# Patient Record
Sex: MALE | Race: WHITE | NOT HISPANIC OR LATINO | Employment: OTHER | URBAN - METROPOLITAN AREA
[De-identification: names, ages, dates, MRNs, and addresses within clinical notes are randomized per-mention and may not be internally consistent; named-entity substitution may affect disease eponyms.]

---

## 2017-04-07 ENCOUNTER — TELEPHONE (OUTPATIENT)
Dept: CARDIOLOGY | Facility: CLINIC | Age: 72
End: 2017-04-07

## 2017-04-07 NOTE — TELEPHONE ENCOUNTER
I will not be able to take care of this patient at [facility].  It has been multiple years since I have seen him, and I do not go to [facility].  I have not seen him since I have been at [facility].  There are cardiologists that evidently go to [facility] to see patients.  I would advise that she contact one of these cardiologist or have someone consult one of these cardiologist for further care.  Thanks.    SG

## 2017-04-07 NOTE — TELEPHONE ENCOUNTER
04/07/17  12:20 PM  Juan Ann  1945     Yue [place] - [phone]    Yue with [facility] calls about this patient who Dr. Moses saw when he was with [facility]. This patient has a medtronic pacemaker; they think it was placed on 12/23/12.     Mr. Ann is a patient at [facility]. He has been in a vegetative state for 3 years.     Yue states that she is unsure of when Mr. Ann's pacemaker was last checked. She is also not sure if it is still working.     She faxed me the temporary [employer] card. She states that she spoke with [employer] and they advised she contact us.     To whom should I refer her?    Mayra CURRIE RN

## 2018-06-29 ENCOUNTER — INPATIENT HOSPITAL (OUTPATIENT)
Dept: URBAN - METROPOLITAN AREA HOSPITAL 84 | Facility: HOSPITAL | Age: 73
End: 2018-06-29

## 2018-06-29 DIAGNOSIS — K44.9 DIAPHRAGMATIC HERNIA WITHOUT OBSTRUCTION OR GANGRENE: ICD-10-CM

## 2018-06-29 DIAGNOSIS — Z43.1 ENCOUNTER FOR ATTENTION TO GASTROSTOMY: ICD-10-CM

## 2018-06-29 PROCEDURE — 43246 EGD PLACE GASTROSTOMY TUBE: CPT | Performed by: INTERNAL MEDICINE

## 2020-08-18 ENCOUNTER — APPOINTMENT (OUTPATIENT)
Dept: GENERAL RADIOLOGY | Facility: HOSPITAL | Age: 75
End: 2020-08-18

## 2020-08-18 ENCOUNTER — HOSPITAL ENCOUNTER (OUTPATIENT)
Facility: HOSPITAL | Age: 75
Setting detail: OBSERVATION
Discharge: HOME | End: 2020-08-21
Attending: EMERGENCY MEDICINE | Admitting: HOSPITALIST

## 2020-08-18 DIAGNOSIS — Z95.828 STATUS POST PICC CENTRAL LINE PLACEMENT: Primary | ICD-10-CM

## 2020-08-18 DIAGNOSIS — Z43.1 ATTENTION TO G-TUBE (HCC): ICD-10-CM

## 2020-08-18 PROBLEM — E11.65 UNCONTROLLED TYPE 2 DIABETES MELLITUS WITH HYPERGLYCEMIA (HCC): Status: ACTIVE | Noted: 2020-05-19

## 2020-08-18 PROBLEM — G40.909 SEIZURE DISORDER (HCC): Status: ACTIVE | Noted: 2020-08-18

## 2020-08-18 PROBLEM — M24.562 CONTRACTURES INVOLVING BOTH KNEES: Status: ACTIVE | Noted: 2020-08-18

## 2020-08-18 PROBLEM — Z95.1 HISTORY OF CORONARY ARTERY BYPASS GRAFT: Status: ACTIVE | Noted: 2020-08-18

## 2020-08-18 PROBLEM — I10 HTN (HYPERTENSION): Status: ACTIVE | Noted: 2020-08-18

## 2020-08-18 PROBLEM — I63.9 CEREBRAL INFARCT (HCC): Status: ACTIVE | Noted: 2020-08-18

## 2020-08-18 PROBLEM — I61.9 INTRACEREBRAL HEMORRHAGE (HCC): Status: ACTIVE | Noted: 2020-08-18

## 2020-08-18 PROBLEM — Z93.0 TRACHEOSTOMY PRESENT (HCC): Status: ACTIVE | Noted: 2020-08-18

## 2020-08-18 PROBLEM — E11.9 DIABETES MELLITUS WITHOUT COMPLICATION (HCC): Status: ACTIVE | Noted: 2020-08-18

## 2020-08-18 PROBLEM — N31.9 NEUROMUSCULAR DYSFUNCTION OF BLADDER: Status: ACTIVE | Noted: 2020-08-18

## 2020-08-18 PROBLEM — K21.9 GERD (GASTROESOPHAGEAL REFLUX DISEASE): Status: ACTIVE | Noted: 2020-08-18

## 2020-08-18 PROBLEM — M62.50 MUSCULAR ATROPHY: Status: ACTIVE | Noted: 2020-08-18

## 2020-08-18 PROBLEM — M24.561 CONTRACTURES INVOLVING BOTH KNEES: Status: ACTIVE | Noted: 2020-08-18

## 2020-08-18 PROBLEM — G81.90 HEMIPLEGIA (HCC): Status: ACTIVE | Noted: 2020-08-18

## 2020-08-18 PROBLEM — Z93.0 TRACHEOSTOMY STATUS (HCC): Status: ACTIVE | Noted: 2020-08-18

## 2020-08-18 PROBLEM — R13.10 DYSPHAGIA: Status: ACTIVE | Noted: 2020-08-18

## 2020-08-18 PROBLEM — E46 MALNUTRITION (HCC): Status: ACTIVE | Noted: 2020-08-18

## 2020-08-18 PROBLEM — I25.10 CAD (CORONARY ARTERY DISEASE): Status: ACTIVE | Noted: 2020-08-18

## 2020-08-18 PROBLEM — I69.959 HEMIPLEGIA OF DOMINANT SIDE AS LATE EFFECT FOLLOWING CEREBROVASCULAR DISEASE (HCC): Status: ACTIVE | Noted: 2020-08-18

## 2020-08-18 PROBLEM — I48.91 ATRIAL FIBRILLATION (HCC): Status: ACTIVE | Noted: 2020-08-18

## 2020-08-18 PROBLEM — Z95.0 PACEMAKER: Status: ACTIVE | Noted: 2020-08-18

## 2020-08-18 LAB
ALBUMIN SERPL-MCNC: 3.3 G/DL (ref 3.5–5.2)
ALBUMIN/GLOB SERPL: 0.8 G/DL
ALP SERPL-CCNC: 69 U/L (ref 39–117)
ALT SERPL W P-5'-P-CCNC: 21 U/L (ref 1–41)
ANION GAP SERPL CALCULATED.3IONS-SCNC: 10 MMOL/L (ref 5–15)
AST SERPL-CCNC: 25 U/L (ref 1–40)
BASOPHILS # BLD AUTO: 0.1 10*3/MM3 (ref 0–0.2)
BASOPHILS NFR BLD AUTO: 0.9 % (ref 0–1.5)
BILIRUB SERPL-MCNC: 0.2 MG/DL (ref 0–1.2)
BUN SERPL-MCNC: 23 MG/DL (ref 8–23)
BUN SERPL-MCNC: ABNORMAL MG/DL
BUN/CREAT SERPL: ABNORMAL
CALCIUM SPEC-SCNC: 9.6 MG/DL (ref 8.6–10.5)
CHLORIDE SERPL-SCNC: 104 MMOL/L (ref 98–107)
CO2 SERPL-SCNC: 29 MMOL/L (ref 22–29)
CREAT SERPL-MCNC: 0.98 MG/DL (ref 0.76–1.27)
DEPRECATED RDW RBC AUTO: 48.6 FL (ref 37–54)
EOSINOPHIL # BLD AUTO: 0.5 10*3/MM3 (ref 0–0.4)
EOSINOPHIL NFR BLD AUTO: 5.9 % (ref 0.3–6.2)
ERYTHROCYTE [DISTWIDTH] IN BLOOD BY AUTOMATED COUNT: 16 % (ref 12.3–15.4)
GFR SERPL CREATININE-BSD FRML MDRD: 75 ML/MIN/1.73
GLOBULIN UR ELPH-MCNC: 4.3 GM/DL
GLUCOSE BLDC GLUCOMTR-MCNC: 111 MG/DL (ref 70–105)
GLUCOSE SERPL-MCNC: 143 MG/DL (ref 65–99)
HCT VFR BLD AUTO: 37.8 % (ref 37.5–51)
HGB BLD-MCNC: 11.7 G/DL (ref 13–17.7)
LYMPHOCYTES # BLD AUTO: 1.5 10*3/MM3 (ref 0.7–3.1)
LYMPHOCYTES NFR BLD AUTO: 18 % (ref 19.6–45.3)
MCH RBC QN AUTO: 26.2 PG (ref 26.6–33)
MCHC RBC AUTO-ENTMCNC: 30.9 G/DL (ref 31.5–35.7)
MCV RBC AUTO: 84.9 FL (ref 79–97)
MONOCYTES # BLD AUTO: 0.4 10*3/MM3 (ref 0.1–0.9)
MONOCYTES NFR BLD AUTO: 4.6 % (ref 5–12)
NEUTROPHILS NFR BLD AUTO: 5.8 10*3/MM3 (ref 1.7–7)
NEUTROPHILS NFR BLD AUTO: 70.6 % (ref 42.7–76)
NRBC BLD AUTO-RTO: 0.1 /100 WBC (ref 0–0.2)
PLATELET # BLD AUTO: 266 10*3/MM3 (ref 140–450)
PMV BLD AUTO: 9.3 FL (ref 6–12)
POTASSIUM SERPL-SCNC: 4.2 MMOL/L (ref 3.5–5.2)
PROT SERPL-MCNC: 7.6 G/DL (ref 6–8.5)
RBC # BLD AUTO: 4.45 10*6/MM3 (ref 4.14–5.8)
SODIUM SERPL-SCNC: 143 MMOL/L (ref 136–145)
WBC # BLD AUTO: 8.2 10*3/MM3 (ref 3.4–10.8)

## 2020-08-18 PROCEDURE — G0378 HOSPITAL OBSERVATION PER HR: HCPCS

## 2020-08-18 PROCEDURE — C1751 CATH, INF, PER/CENT/MIDLINE: HCPCS

## 2020-08-18 PROCEDURE — 36410 VNPNXR 3YR/> PHY/QHP DX/THER: CPT

## 2020-08-18 PROCEDURE — 80053 COMPREHEN METABOLIC PANEL: CPT | Performed by: PHYSICIAN ASSISTANT

## 2020-08-18 PROCEDURE — 82962 GLUCOSE BLOOD TEST: CPT

## 2020-08-18 PROCEDURE — 99219 PR INITIAL OBSERVATION CARE/DAY 50 MINUTES: CPT | Performed by: PHYSICIAN ASSISTANT

## 2020-08-18 PROCEDURE — 99284 EMERGENCY DEPT VISIT MOD MDM: CPT

## 2020-08-18 PROCEDURE — 85025 COMPLETE CBC W/AUTO DIFF WBC: CPT | Performed by: PHYSICIAN ASSISTANT

## 2020-08-18 RX ORDER — LORAZEPAM 2 MG/ML
2 INJECTION INTRAMUSCULAR DAILY PRN
Status: DISCONTINUED | OUTPATIENT
Start: 2020-08-18 | End: 2020-08-21 | Stop reason: HOSPADM

## 2020-08-18 RX ORDER — ALUMINA, MAGNESIA, AND SIMETHICONE 2400; 2400; 240 MG/30ML; MG/30ML; MG/30ML
15 SUSPENSION ORAL EVERY 6 HOURS PRN
Status: DISCONTINUED | OUTPATIENT
Start: 2020-08-18 | End: 2020-08-21 | Stop reason: HOSPADM

## 2020-08-18 RX ORDER — ACETAMINOPHEN 650 MG/1
650 SUPPOSITORY RECTAL EVERY 4 HOURS PRN
Status: DISCONTINUED | OUTPATIENT
Start: 2020-08-18 | End: 2020-08-21 | Stop reason: HOSPADM

## 2020-08-18 RX ORDER — LEVETIRACETAM 100 MG/ML
1000 SOLUTION ORAL 2 TIMES DAILY
COMMUNITY

## 2020-08-18 RX ORDER — ONDANSETRON 2 MG/ML
4 INJECTION INTRAMUSCULAR; INTRAVENOUS EVERY 6 HOURS PRN
Status: DISCONTINUED | OUTPATIENT
Start: 2020-08-18 | End: 2020-08-21 | Stop reason: HOSPADM

## 2020-08-18 RX ORDER — MAGNESIUM SULFATE 1 G/100ML
1 INJECTION INTRAVENOUS AS NEEDED
Status: DISCONTINUED | OUTPATIENT
Start: 2020-08-18 | End: 2020-08-21 | Stop reason: HOSPADM

## 2020-08-18 RX ORDER — ACETAMINOPHEN 325 MG/1
650 TABLET ORAL EVERY 4 HOURS PRN
Status: DISCONTINUED | OUTPATIENT
Start: 2020-08-18 | End: 2020-08-21 | Stop reason: HOSPADM

## 2020-08-18 RX ORDER — METOCLOPRAMIDE HYDROCHLORIDE 5 MG/5ML
5 SOLUTION ORAL 4 TIMES DAILY
COMMUNITY

## 2020-08-18 RX ORDER — SODIUM CHLORIDE 0.9 % (FLUSH) 0.9 %
10 SYRINGE (ML) INJECTION EVERY 12 HOURS SCHEDULED
Status: DISCONTINUED | OUTPATIENT
Start: 2020-08-18 | End: 2020-08-21 | Stop reason: HOSPADM

## 2020-08-18 RX ORDER — LACOSAMIDE 10 MG/ML
200 SOLUTION ORAL EVERY 12 HOURS SCHEDULED
COMMUNITY

## 2020-08-18 RX ORDER — AMIODARONE HYDROCHLORIDE 200 MG/1
200 TABLET ORAL DAILY
COMMUNITY

## 2020-08-18 RX ORDER — MIDODRINE HYDROCHLORIDE 5 MG/1
5 TABLET ORAL 2 TIMES DAILY
COMMUNITY

## 2020-08-18 RX ORDER — ONDANSETRON 4 MG/1
4 TABLET, FILM COATED ORAL EVERY 6 HOURS PRN
Status: DISCONTINUED | OUTPATIENT
Start: 2020-08-18 | End: 2020-08-21 | Stop reason: HOSPADM

## 2020-08-18 RX ORDER — OMEPRAZOLE 10 MG
40 CAPSULE,DELAYED RELEASE (ENTERIC COATED) ORAL EVERY MORNING
COMMUNITY

## 2020-08-18 RX ORDER — MAGNESIUM SULFATE HEPTAHYDRATE 40 MG/ML
2 INJECTION, SOLUTION INTRAVENOUS AS NEEDED
Status: DISCONTINUED | OUTPATIENT
Start: 2020-08-18 | End: 2020-08-21 | Stop reason: HOSPADM

## 2020-08-18 RX ORDER — AMIODARONE HYDROCHLORIDE 200 MG/1
200 TABLET ORAL DAILY
Status: DISCONTINUED | OUTPATIENT
Start: 2020-08-19 | End: 2020-08-21 | Stop reason: HOSPADM

## 2020-08-18 RX ORDER — LEVETIRACETAM 10 MG/ML
1000 INJECTION INTRAVASCULAR EVERY 12 HOURS SCHEDULED
Status: DISCONTINUED | OUTPATIENT
Start: 2020-08-18 | End: 2020-08-21 | Stop reason: HOSPADM

## 2020-08-18 RX ORDER — LORAZEPAM 2 MG/ML
2 INJECTION INTRAMUSCULAR AS NEEDED
COMMUNITY

## 2020-08-18 RX ORDER — DEXTROSE AND SODIUM CHLORIDE 5; .45 G/100ML; G/100ML
100 INJECTION, SOLUTION INTRAVENOUS CONTINUOUS
Status: DISCONTINUED | OUTPATIENT
Start: 2020-08-18 | End: 2020-08-21 | Stop reason: HOSPADM

## 2020-08-18 RX ORDER — FERROUS SULFATE 300 MG/5ML
300 LIQUID (ML) ORAL EVERY MORNING
COMMUNITY

## 2020-08-18 RX ORDER — AMOXICILLIN 250 MG
1 CAPSULE ORAL 2 TIMES DAILY
Status: DISCONTINUED | OUTPATIENT
Start: 2020-08-18 | End: 2020-08-21 | Stop reason: HOSPADM

## 2020-08-18 RX ORDER — POTASSIUM CHLORIDE 20 MEQ/1
40 TABLET, EXTENDED RELEASE ORAL AS NEEDED
Status: DISCONTINUED | OUTPATIENT
Start: 2020-08-18 | End: 2020-08-21 | Stop reason: HOSPADM

## 2020-08-18 RX ORDER — ONDANSETRON HYDROCHLORIDE 4 MG/5ML
4 SOLUTION ORAL EVERY 8 HOURS PRN
COMMUNITY

## 2020-08-18 RX ORDER — MIDODRINE HYDROCHLORIDE 5 MG/1
5 TABLET ORAL
Status: DISCONTINUED | OUTPATIENT
Start: 2020-08-19 | End: 2020-08-21 | Stop reason: HOSPADM

## 2020-08-18 RX ORDER — SODIUM CHLORIDE 0.9 % (FLUSH) 0.9 %
10 SYRINGE (ML) INJECTION AS NEEDED
Status: DISCONTINUED | OUTPATIENT
Start: 2020-08-18 | End: 2020-08-21 | Stop reason: HOSPADM

## 2020-08-18 RX ORDER — ZINC OXIDE 20 %
OINTMENT (GRAM) TOPICAL DAILY
Status: DISCONTINUED | OUTPATIENT
Start: 2020-08-19 | End: 2020-08-21 | Stop reason: HOSPADM

## 2020-08-18 RX ORDER — DIPHENHYDRAMINE HCL 12.5MG/5ML
12.5 LIQUID (ML) ORAL 4 TIMES DAILY PRN
COMMUNITY

## 2020-08-18 RX ORDER — NITROGLYCERIN 0.4 MG/1
0.4 TABLET SUBLINGUAL
Status: DISCONTINUED | OUTPATIENT
Start: 2020-08-18 | End: 2020-08-21 | Stop reason: HOSPADM

## 2020-08-18 RX ORDER — ACETAMINOPHEN 160 MG/5ML
160 SOLUTION ORAL EVERY 4 HOURS PRN
COMMUNITY

## 2020-08-18 RX ORDER — INSULIN GLARGINE 100 [IU]/ML
27 INJECTION, SOLUTION SUBCUTANEOUS EVERY 12 HOURS SCHEDULED
Status: DISCONTINUED | OUTPATIENT
Start: 2020-08-18 | End: 2020-08-21 | Stop reason: HOSPADM

## 2020-08-18 RX ORDER — ACETAMINOPHEN 160 MG/5ML
160 SOLUTION ORAL EVERY 4 HOURS PRN
Status: DISCONTINUED | OUTPATIENT
Start: 2020-08-18 | End: 2020-08-21 | Stop reason: HOSPADM

## 2020-08-18 RX ORDER — AMOXICILLIN 250 MG
1 CAPSULE ORAL 2 TIMES DAILY
COMMUNITY

## 2020-08-18 RX ORDER — CHOLECALCIFEROL (VITAMIN D3) 125 MCG
5 CAPSULE ORAL NIGHTLY PRN
Status: DISCONTINUED | OUTPATIENT
Start: 2020-08-18 | End: 2020-08-21 | Stop reason: HOSPADM

## 2020-08-18 RX ORDER — DIPHENHYDRAMINE HCL 12.5MG/5ML
12.5 LIQUID (ML) ORAL 4 TIMES DAILY PRN
Status: DISCONTINUED | OUTPATIENT
Start: 2020-08-18 | End: 2020-08-21 | Stop reason: HOSPADM

## 2020-08-18 RX ORDER — FERROUS SULFATE 300 MG/5ML
300 LIQUID (ML) ORAL DAILY
Status: DISCONTINUED | OUTPATIENT
Start: 2020-08-19 | End: 2020-08-21 | Stop reason: HOSPADM

## 2020-08-18 RX ORDER — BISACODYL 10 MG
10 SUPPOSITORY, RECTAL RECTAL DAILY PRN
Status: DISCONTINUED | OUTPATIENT
Start: 2020-08-18 | End: 2020-08-21 | Stop reason: HOSPADM

## 2020-08-18 RX ORDER — ACETAMINOPHEN 160 MG/5ML
650 SOLUTION ORAL EVERY 4 HOURS PRN
Status: DISCONTINUED | OUTPATIENT
Start: 2020-08-18 | End: 2020-08-21 | Stop reason: HOSPADM

## 2020-08-18 NOTE — ED PROVIDER NOTES
Subjective   Patient is a 74-year-old male sent from the nursing home after he had pulled out his G-tube as well as his midline.  Patient apparently uses the G-tube for constant feedings and midline for IV antibiotics for sepsis.  Patient is unable offer complaints due to severe dementia          Review of Systems  Unable to be obtained due to severe dementia  No past medical history on file.    Allergies   Allergen Reactions   • Peanut (Diagnostic) Other (See Comments)       No past surgical history on file.    No family history on file.    Social History     Socioeconomic History   • Marital status:      Spouse name: Not on file   • Number of children: Not on file   • Years of education: Not on file   • Highest education level: Not on file           Objective   Physical Exam  Lungs are clear.  Heart has rate rhythm.  Chest is nontender.  Abdomen soft.  The patient does have a partial G-tube protruding which appears to have been ripped in half.  I am unable to remove the G-tube as it is still inflated but I am unable to deflate the tip as well.  Extremity exam shows no abnormalities.  Procedures           ED Course                                           MDM  Number of Diagnoses or Management Options  Diagnosis management comments: Patient had a midline placed by the PICU team in the left upper arm.  I am unable to remove the partial G-tube.  A clamp was placed across the G-tube so it does not retract into his stomach.  A dressing was applied.  Patient was brought to hospital for GI evaluation.    Risk of Complications, Morbidity, and/or Mortality  Presenting problems: moderate  Diagnostic procedures: moderate  Management options: moderate    Patient Progress  Patient progress: stable      Final diagnoses:   Status post PICC central line placement   Attention to G-tube (CMS/[facility])            Regis Rouse MD  08/18/20 6454

## 2020-08-18 NOTE — ED NOTES
IV Team called per Dr. Rouse's request for midline, I transferred the phone call to Dhara GEORGE for more information.     Beronica Lang  08/18/20 8157

## 2020-08-18 NOTE — CONSULTS
powerglide midline placed in left upper arm under u/s guidance. Flushes easily and blood return noted. Pt confused and unable to follow commands.

## 2020-08-18 NOTE — ED NOTES
Numerous attempts to call [facility]. No answer on any attempts. Dr Rouse notified     Cortney Longoria RN  08/18/20 4725

## 2020-08-18 NOTE — ED NOTES
Per EMS patient sent here for pulling out his midline and Gtube. Patient placed on 5l/O2 per n/c for low O2 sat     Cortney Longoria RN  08/18/20 7056

## 2020-08-18 NOTE — H&P
[facility] Medicine Services      Patient Name: Juan Ann  : 1945  MRN: [id number]  Primary Care Physician: Angeles Figueroa MD  Date of admission: 2020    Patient Care Team:  Angeles Figueroa MD as PCP - General          Subjective   History Present Illness     Chief Complaint:   Chief Complaint   Patient presents with   • medical device problem       Mr. Ann is a 74 y.o. male presents to [facility] ER 2020 complaining of issues with his G-tube and midline since earlier today.  Patient is a resident at [facility].  Patient has a history of dementia and had pulled at and ripped a piece of his G-tube off.  Patient also had pulled out his midline.  Patient was sent to ER to have these replaced and/or fixed.  Patient has severe dementia and is nonverbal and cannot provide any history.  Patient also has a history of trach in place.  Patient is a full code.  Of note, patient has a midline and to continue IV antibiotics for right hip wound.    In the ED, no labs or imaging performed in ED.  Patient is afebrile, pulse in the 60s, on 5 L nasal cannula 95% SPO2 and blood pressure normotensive.  No medications given in ED.    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 No pertinent surgical history.        Family History: Family history is unknown  by patient. Otherwise pertinent FHx was reviewed and unremarkable.     Social History:  reports that he drank alcohol. He reports that he has current or past drug history.      Medications:  Prior to Admission medications    Not on File       Allergies:    Allergies   Allergen Reactions   • Nuts Unknown - High Severity       Objective   Objective     Vital Signs  Temp:  [98.1 °F (36.7 °C)-98.8 °F (37.1 °C)] 98.1 °F (36.7 °C)  Heart Rate:  [59-72] 72  Resp:  [20] 20  BP: (110-121)/(48-84) 116/74  SpO2:  [91 %-97 %] 97 %  on  Flow (L/min):  [5] 5;   Device (Oxygen Therapy): nasal cannula  Body mass index is 22.81 kg/m².    Physical Exam   Constitutional: He appears well-developed and well-nourished. No distress.   HENT:   Head: Normocephalic and atraumatic.   Nose: Nose normal.   Mouth/Throat: No oropharyngeal exudate.   Eyes: Pupils are equal, round, and reactive to light. Conjunctivae and EOM are normal.   Neck: Normal range of motion.   Cardiovascular: Normal rate, regular rhythm, normal heart sounds and intact distal pulses.   S1, S2 audible.   Pulmonary/Chest: Effort normal and breath sounds normal. No respiratory distress. He has no wheezes. He has no rales.   On 5 L nasal cannula.   Abdominal: Soft. Bowel sounds are normal. He exhibits no distension. There is no tenderness.   Musculoskeletal: Normal range of motion. He exhibits no edema, tenderness or deformity.   Neurological: He is alert. No cranial nerve deficit.   Skin: Skin is warm. Rash (on torso, arms and legs) noted. He is not diaphoretic. No erythema.   Psychiatric:   Patient is nonverbal with history of dementia   Nursing note and vitals reviewed.      Results Review:  I have personally reviewed most recent cardiac tracings, lab results and radiology images and interpretations and agree with findings.    Results from last 7 days   Lab Units 08/18/20 2018   WBC 10*3/mm3 8.20   HEMOGLOBIN g/dL 11.7*   HEMATOCRIT % 37.8   PLATELETS 10*3/mm3 266          Results from last 7 days   Lab Units 08/18/20 2018   SODIUM mmol/L 143   POTASSIUM mmol/L 4.2   CHLORIDE mmol/L 104   CO2 mmol/L 29.0   BUN  23   CREATININE mg/dL 0.98   GLUCOSE mg/dL 143*   CALCIUM mg/dL 9.6   ALT (SGPT) U/L 21   AST (SGOT) U/L 25     Estimated Creatinine Clearance: 64.6 mL/min (by C-G formula based on SCr of 0.98 mg/dL).  Brief Urine Lab Results     None          Microbiology Results (last 10 days)     ** No results found for the last 240 hours. **          ECG/EMG Results (most recent)     None                    No radiology results for the last 7 days      Estimated Creatinine Clearance: 64.6 mL/min (by C-G formula based on SCr of 0.98 mg/dL).    Assessment/Plan   Assessment/Plan       Active Hospital Problems    Diagnosis  POA   • **Status post PICC central line placement [Z95.828]  Not Applicable   • Attention to G-tube (CMS/[facility]) [Z43.1]  Not Applicable   • Type 2 diabetes mellitus (CMS/[facility]) [E11.9]  Yes   • Atrial fibrillation (CMS/[facility]) [I48.91]  Yes   • Tracheostomy status (CMS/[facility]) [Z93.0]  Not Applicable   • CAD (coronary artery disease) [I25.10]  Yes   • History of coronary artery bypass graft [Z95.1]  Not Applicable   • Seizure disorder (CMS/[facility]) [G40.909]  Yes   • GERD (gastroesophageal reflux disease) [K21.9]  Yes   • Pacemaker [Z95.0]  Yes   • 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         Status post PICC line placement  -Patient had ripped out his midline, this was replaced in the ED     G-tube malfunction  -Patient had ripped a portion of his G-tube off prior to arrival  -The remaining portion of the G-tube was clamped in the ED  -GI consult  -IV fluids D5 half-normal saline at 100 mL/h ordered  -Nutrition consult    Rash of torso, arms and back  -Continue home mycolog    Right hip wound  - Patient is afebrile, pulse in the 60s, on 5 L  nasal cannula 95% SPO2 and blood pressure normotensive.    -No medications given in ED.  -Wound care consult  -Check CBC, CMP  -Continue Zosyn    Tracheostomy in place  -Continue oxygen requirement  -Trach cares    Dementia  -Seemingly at baseline, patient is nonverbal    Atrial fibrillation, pacemaker  -Hold home Eliquis, Lovenox until G-tube is replaced  -Continue home amiodarone, Lopressor when G-tube is replaced    Chronic hypotension  -Continue home midodrine when G-tube is replaced    Diabetes mellitus type 2  -Hold home Humulin,   -Continue home Levemir  -SSI, Accu-Cheks 3 times daily before meals  -Check A1c    Seizure disorder  -Continue Keppra IV until G-tube is replaced  -Continue home Vimpat IV until G-tube is replaced    GERD  -Continue home PPI when G-tube is replaced    CAD, history of CABG    CVA with residual hemiplegia      VTE Prophylaxis -Lovenox  Mechanical Order History:     None      Pharmalogical Order History:     None          CODE STATUS:    Code Status and Medical Interventions:   Ordered at: 08/18/20 2007     Code Status:    CPR     Medical Interventions (Level of Support Prior to Arrest):    Full       This patient has been examined wearing appropriate Personal Protective Equipment and discussed with hospital infection control department. 08/19/20      I discussed the patient's findings and my recommendations with patient.        Electronically signed by BRAYDEN Lindo, 08/18/20, 6:48 PM.  [facility] Hospitalist Team

## 2020-08-18 NOTE — ED NOTES
Umbilical clamp placed on patients G tube. 4X4's placed around and over piece of g tube that is left and taped down.     Cortney Longoria RN  08/18/20 1113

## 2020-08-19 ENCOUNTER — ON CAMPUS - OUTPATIENT (OUTPATIENT)
Dept: URBAN - METROPOLITAN AREA HOSPITAL 85 | Facility: HOSPITAL | Age: 75
End: 2020-08-19

## 2020-08-19 DIAGNOSIS — K94.23 GASTROSTOMY MALFUNCTION: ICD-10-CM

## 2020-08-19 DIAGNOSIS — I63.9 CEREBRAL INFARCTION, UNSPECIFIED: ICD-10-CM

## 2020-08-19 DIAGNOSIS — D64.89 OTHER SPECIFIED ANEMIAS: ICD-10-CM

## 2020-08-19 DIAGNOSIS — F03.90 UNSPECIFIED DEMENTIA, UNSPECIFIED SEVERITY, WITHOUT BEHAVIOR: ICD-10-CM

## 2020-08-19 PROBLEM — Z43.1 ATTENTION TO G-TUBE (HCC): Status: ACTIVE | Noted: 2020-08-19

## 2020-08-19 PROBLEM — I69.959 HEMIPLEGIA OF DOMINANT SIDE AS LATE EFFECT FOLLOWING CEREBROVASCULAR DISEASE (HCC): Chronic | Status: ACTIVE | Noted: 2020-08-18

## 2020-08-19 PROBLEM — Z95.0 PACEMAKER: Chronic | Status: ACTIVE | Noted: 2020-08-18

## 2020-08-19 PROBLEM — E11.9 TYPE 2 DIABETES MELLITUS (HCC): Chronic | Status: ACTIVE | Noted: 2020-08-19

## 2020-08-19 PROBLEM — E11.65 UNCONTROLLED TYPE 2 DIABETES MELLITUS WITH HYPERGLYCEMIA (HCC): Chronic | Status: ACTIVE | Noted: 2020-05-19

## 2020-08-19 PROBLEM — G40.909 SEIZURE DISORDER (HCC): Chronic | Status: ACTIVE | Noted: 2020-08-18

## 2020-08-19 PROBLEM — I25.10 CAD (CORONARY ARTERY DISEASE): Chronic | Status: ACTIVE | Noted: 2020-08-18

## 2020-08-19 PROBLEM — I48.91 ATRIAL FIBRILLATION (HCC): Chronic | Status: ACTIVE | Noted: 2020-08-18

## 2020-08-19 PROBLEM — K21.9 GERD (GASTROESOPHAGEAL REFLUX DISEASE): Chronic | Status: ACTIVE | Noted: 2020-08-18

## 2020-08-19 PROBLEM — Z95.1 HISTORY OF CORONARY ARTERY BYPASS GRAFT: Chronic | Status: ACTIVE | Noted: 2020-08-18

## 2020-08-19 PROBLEM — Z93.0 TRACHEOSTOMY STATUS (HCC): Chronic | Status: ACTIVE | Noted: 2020-08-18

## 2020-08-19 LAB
ANION GAP SERPL CALCULATED.3IONS-SCNC: 11 MMOL/L (ref 5–15)
BASOPHILS # BLD AUTO: 0.1 10*3/MM3 (ref 0–0.2)
BASOPHILS NFR BLD AUTO: 1.1 % (ref 0–1.5)
BUN SERPL-MCNC: 20 MG/DL (ref 8–23)
BUN SERPL-MCNC: ABNORMAL MG/DL
BUN/CREAT SERPL: ABNORMAL
CALCIUM SPEC-SCNC: 9 MG/DL (ref 8.6–10.5)
CHLORIDE SERPL-SCNC: 104 MMOL/L (ref 98–107)
CO2 SERPL-SCNC: 27 MMOL/L (ref 22–29)
CREAT SERPL-MCNC: 0.93 MG/DL (ref 0.76–1.27)
DEPRECATED RDW RBC AUTO: 46.8 FL (ref 37–54)
EOSINOPHIL # BLD AUTO: 0.5 10*3/MM3 (ref 0–0.4)
EOSINOPHIL NFR BLD AUTO: 6 % (ref 0.3–6.2)
ERYTHROCYTE [DISTWIDTH] IN BLOOD BY AUTOMATED COUNT: 15.9 % (ref 12.3–15.4)
GFR SERPL CREATININE-BSD FRML MDRD: 80 ML/MIN/1.73
GLUCOSE BLDC GLUCOMTR-MCNC: 105 MG/DL (ref 70–105)
GLUCOSE SERPL-MCNC: 153 MG/DL (ref 65–99)
HCT VFR BLD AUTO: 34.2 % (ref 37.5–51)
HGB BLD-MCNC: 11.1 G/DL (ref 13–17.7)
LYMPHOCYTES # BLD AUTO: 1.7 10*3/MM3 (ref 0.7–3.1)
LYMPHOCYTES NFR BLD AUTO: 22.3 % (ref 19.6–45.3)
MAGNESIUM SERPL-MCNC: 2.1 MG/DL (ref 1.6–2.4)
MCH RBC QN AUTO: 26.9 PG (ref 26.6–33)
MCHC RBC AUTO-ENTMCNC: 32.4 G/DL (ref 31.5–35.7)
MCV RBC AUTO: 83.1 FL (ref 79–97)
MONOCYTES # BLD AUTO: 0.5 10*3/MM3 (ref 0.1–0.9)
MONOCYTES NFR BLD AUTO: 6 % (ref 5–12)
NEUTROPHILS NFR BLD AUTO: 4.9 10*3/MM3 (ref 1.7–7)
NEUTROPHILS NFR BLD AUTO: 64.6 % (ref 42.7–76)
NRBC BLD AUTO-RTO: 0.1 /100 WBC (ref 0–0.2)
PLATELET # BLD AUTO: 236 10*3/MM3 (ref 140–450)
PMV BLD AUTO: 9.8 FL (ref 6–12)
POTASSIUM SERPL-SCNC: 4.1 MMOL/L (ref 3.5–5.2)
RBC # BLD AUTO: 4.12 10*6/MM3 (ref 4.14–5.8)
SODIUM SERPL-SCNC: 142 MMOL/L (ref 136–145)
WBC # BLD AUTO: 7.5 10*3/MM3 (ref 3.4–10.8)

## 2020-08-19 PROCEDURE — 99212 OFFICE O/P EST SF 10 MIN: CPT | Performed by: INTERNAL MEDICINE

## 2020-08-19 PROCEDURE — 85025 COMPLETE CBC W/AUTO DIFF WBC: CPT | Performed by: PHYSICIAN ASSISTANT

## 2020-08-19 PROCEDURE — 82962 GLUCOSE BLOOD TEST: CPT

## 2020-08-19 PROCEDURE — 96372 THER/PROPH/DIAG INJ SC/IM: CPT

## 2020-08-19 PROCEDURE — G0378 HOSPITAL OBSERVATION PER HR: HCPCS

## 2020-08-19 PROCEDURE — 63710000001 INSULIN GLARGINE PER 5 UNITS: Performed by: PHYSICIAN ASSISTANT

## 2020-08-19 PROCEDURE — 25010000003 LEVETIRACETAM IN NACL 0.75% 1000 MG/100ML SOLUTION: Performed by: PHYSICIAN ASSISTANT

## 2020-08-19 PROCEDURE — 94799 UNLISTED PULMONARY SVC/PX: CPT

## 2020-08-19 PROCEDURE — 83735 ASSAY OF MAGNESIUM: CPT | Performed by: PHYSICIAN ASSISTANT

## 2020-08-19 PROCEDURE — 96365 THER/PROPH/DIAG IV INF INIT: CPT

## 2020-08-19 PROCEDURE — 96367 TX/PROPH/DG ADDL SEQ IV INF: CPT

## 2020-08-19 PROCEDURE — 80048 BASIC METABOLIC PNL TOTAL CA: CPT | Performed by: PHYSICIAN ASSISTANT

## 2020-08-19 PROCEDURE — 25010000002 PIPERACILLIN SOD-TAZOBACTAM PER 1 G: Performed by: PHYSICIAN ASSISTANT

## 2020-08-19 PROCEDURE — 25010000002 ENOXAPARIN PER 10 MG: Performed by: PHYSICIAN ASSISTANT

## 2020-08-19 PROCEDURE — 96361 HYDRATE IV INFUSION ADD-ON: CPT

## 2020-08-19 PROCEDURE — 99225 PR SBSQ OBSERVATION CARE/DAY 25 MINUTES: CPT | Performed by: HOSPITALIST

## 2020-08-19 PROCEDURE — 99204 OFFICE O/P NEW MOD 45 MIN: CPT | Performed by: NURSE PRACTITIONER

## 2020-08-19 RX ORDER — WHEY PROTEIN ISOLATE 6 G-25/7 G
1 POWDER (GRAM) ORAL 2 TIMES DAILY
Status: DISCONTINUED | OUTPATIENT
Start: 2020-08-19 | End: 2020-08-21 | Stop reason: HOSPADM

## 2020-08-19 RX ORDER — SODIUM HYPOCHLORITE 1.25 MG/ML
SOLUTION TOPICAL DAILY
Status: DISCONTINUED | OUTPATIENT
Start: 2020-08-19 | End: 2020-08-21 | Stop reason: HOSPADM

## 2020-08-19 RX ADMIN — SODIUM CHLORIDE 200 MG: 900 INJECTION, SOLUTION INTRAVENOUS at 01:55

## 2020-08-19 RX ADMIN — ZINC OXIDE: 200 OINTMENT TOPICAL at 12:03

## 2020-08-19 RX ADMIN — DAKIN'S SOLUTION 0.125% (QUARTER STRENGTH): 0.12 SOLUTION at 12:04

## 2020-08-19 RX ADMIN — Medication 10 ML: at 00:13

## 2020-08-19 RX ADMIN — LEVETIRACETAM 1000 MG: 10 INJECTION INTRAVENOUS at 23:00

## 2020-08-19 RX ADMIN — LEVETIRACETAM 1000 MG: 10 INJECTION INTRAVENOUS at 00:04

## 2020-08-19 RX ADMIN — LEVETIRACETAM 1000 MG: 10 INJECTION INTRAVENOUS at 10:49

## 2020-08-19 RX ADMIN — Medication 10 ML: at 10:49

## 2020-08-19 RX ADMIN — PIPERACILLIN SODIUM,TAZOBACTAM SODIUM 3.38 G: 3; .375 INJECTION, POWDER, FOR SOLUTION INTRAVENOUS at 04:56

## 2020-08-19 RX ADMIN — ENOXAPARIN SODIUM 40 MG: 40 INJECTION SUBCUTANEOUS at 00:04

## 2020-08-19 RX ADMIN — PIPERACILLIN SODIUM,TAZOBACTAM SODIUM 3.38 G: 3; .375 INJECTION, POWDER, FOR SOLUTION INTRAVENOUS at 12:21

## 2020-08-19 RX ADMIN — NYSTATIN, TRIAMCINOLONE ACETONIDE 1 APPLICATION: 100000; 1 CREAM TOPICAL at 12:04

## 2020-08-19 RX ADMIN — SODIUM CHLORIDE 200 MG: 900 INJECTION, SOLUTION INTRAVENOUS at 13:30

## 2020-08-19 RX ADMIN — DEXTROSE AND SODIUM CHLORIDE 100 ML/HR: 5; 450 INJECTION, SOLUTION INTRAVENOUS at 00:04

## 2020-08-19 RX ADMIN — Medication 10 ML: at 23:00

## 2020-08-19 RX ADMIN — INSULIN GLARGINE 27 UNITS: 100 INJECTION, SOLUTION SUBCUTANEOUS at 23:00

## 2020-08-19 RX ADMIN — INSULIN GLARGINE 27 UNITS: 100 INJECTION, SOLUTION SUBCUTANEOUS at 00:04

## 2020-08-19 RX ADMIN — ENOXAPARIN SODIUM 40 MG: 40 INJECTION SUBCUTANEOUS at 15:02

## 2020-08-19 NOTE — PLAN OF CARE
Problem: Patient Care Overview  Goal: Plan of Care Review  Outcome: Ongoing (interventions implemented as appropriate)  Flowsheets  Taken 8/19/2020 0415 by Estefani Liz, RN  Progress: no change  Taken 8/18/2020 1945 by Kin Wadsworth, RN  Plan of Care Reviewed With: patient  Note:   Patient was placed in restraints 2030 to prevent pt from pulling out central line, camarena, and g-tube.  No new concerns at this time, will continue to monitor.

## 2020-08-19 NOTE — NURSING NOTE
74-year-old male who presented to the hospital from an ECF following his pulling his G-tube and PICC line.  Patient has dementia.  Patient is currently on a agility immersion surface.    Patient has 2 unstageable pressure injuries to the left lateral foot.  Patient currently has offloading boots in place.    Unstageable left foot pressure injury: The most distal of the 2 wounds is approximate size of one by one is covered in soft brown to purple eschar small amount of serous exudate is noted there is no erythema no induration no odor or warmth noted to the wound    Unstageable left foot pressure injury: The injuries approximate size of 2 cm x 1 cm it is covered in soft brown eschar there is a small amount of serous exudate noted no overt symptoms of infection are noted to the injury.    Stage IV right hip pressure injury: There is a stage IV pressure injury to the right hip which is mostly covered in yellow slough probably 80% of the wound is there is no odor no induration the wound does appear to be chronic in nature.  Approximate size of this wound is 2.4 cm x 2 cm with a depth of 1cm there is a small to moderate amount of serous exudate noted.    There are no open injuries to the sacral area skin has some old scarring but nothing is open at this time patient does have of a zinc oxide and antifungal ordered this would be appropriate and would recommend continuing.  Will recommend silicone foam border dressing to the left foot and will recommend a Dakin's dressing daily to the right hip.  Continue with pressure injury prevention strategies

## 2020-08-19 NOTE — PLAN OF CARE
Problem: Patient Care Overview  Goal: Plan of Care Review  Outcome: Ongoing (interventions implemented as appropriate)  Flowsheets  Taken 8/19/2020 1516 by Bernarda Hernandez, RN  Progress: no change  Taken 8/18/2020 1945 by Kin Wadsworth, RN  Plan of Care Reviewed With: patient  Note:   Pt remains nonverbal and trying to pull at G-tube, camarena, oxygen when restraints removed. GI to replace G-tube today at bedside. Will continue to monitor

## 2020-08-19 NOTE — PROGRESS NOTES
Discharge Planning Assessment  [facility]     Patient Name: Juan Ann  MRN: [id number]  Today's Date: 8/19/2020    Admit Date: 8/18/2020    Discharge Needs Assessment     Row Name 08/19/20 1425       Living Environment    Lives With  facility resident resides at [facility]    Current Living Arrangements  extended care facility    Potentially Unsafe Housing Conditions  unable to assess    Primary Care Provided by  other (see comments) staff at facility    Provides Primary Care For  no one, unable/limited ability to care for self    Family Caregiver if Needed  spouse       Transition Planning    Patient/Family Anticipates Transition to  long term care facility       Discharge Needs Assessment    Concerns to be Addressed  discharge planning    Provided Post Acute Provider List?  N/A    N/A Provider List Comment  patient is a resident at [facility] for long term care        Discharge Plan     Row Name 08/19/20 1428       Plan    Plan  return to long term care at [facility]    Patient/Family in Agreement with Plan  yes    Plan Comments  talked to spouse rivera on phone and she states that patient is to return to [facility] for care         Patient Forms    Patient Observation Letter  Delivered    Delivered to  Support person reviewed with wife rivera on phone and information explained; she denies need for copy to be sent to her    Method of delivery  Telephone            Carol naegele rn  Case management  Office number [phone]  Cell phone [phone]

## 2020-08-19 NOTE — DISCHARGE PLACEMENT REQUEST
"Dorina Ann (74 y.o. Male)     Date of Birth Social Security Number Address Home Phone MRN    1945  [address] [place] [address] [address] [place] [address] [phone] [id number]    Buddhism Marital Status          Unknown        Admission Date Admission Type Admitting Provider Attending Provider Department, Room/Bed    20 Emergency Vinh Queen, Vinh Vera DO [facility] 3C MEDICAL INPATIENT, 364/1    Discharge Date Discharge Disposition Discharge Destination                       Attending Provider:  Vinh Queen DO    Allergies:  Nuts    Isolation:  None   Infection:  None   Code Status:  CPR    Ht:  172.7 cm (68\")   Wt:  68 kg (150 lb)    Admission Cmt:  None   Principal Problem:  Status post PICC central line placement [Z95.828]                 Active Insurance as of 2020     Primary Coverage     Payor Plan Insurance Group Employer/Plan Group    MEDICARE MEDICARE A & B      Payor Plan Address Payor Plan Phone Number Payor Plan Fax Number Effective Dates    [address] [phone]  1985 - None Entered    [place] [address]       Subscriber Name Subscriber Birth Date Member ID       DORINA ANN 1945 [id number]                 Emergency Contacts      (Rel.) Home Phone Work Phone Mobile Phone    CONTACT, NO (Other) -- -- [phone]               History & Physical      Donavan España PA at 20 1848                [facility] Medicine Services      Patient Name: Dorina Ann  : 1945  MRN: [id number]  Primary Care Physician: Angeles Figueroa MD  Date of admission: 2020    Patient Care Team:  Angeles Figueroa MD as PCP - General          Subjective   History Present Illness     Chief Complaint:   Chief Complaint   Patient presents with   • medical device problem       Mr. Ann is a 74 y.o. male presents to [facility] ER 2020 complaining of issues " with his G-tube and midline since earlier today.  Patient is a resident at [facility].  Patient has a history of dementia and had pulled at and ripped a piece of his G-tube off.  Patient also had pulled out his midline.  Patient was sent to ER to have these replaced and/or fixed.  Patient has severe dementia and is nonverbal and cannot provide any history.  Patient also has a history of trach in place.  Patient is a full code.  Of note, patient has a midline and to continue IV antibiotics for right hip wound.    In the ED, no labs or imaging performed in ED.  Patient is afebrile, pulse in the 60s, on 5 L nasal cannula 95% SPO2 and blood pressure normotensive.  No medications given in ED.    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 No pertinent surgical history.        Family History: Family history is unknown by patient. Otherwise pertinent FHx was reviewed and unremarkable.     Social History:  reports that he drank alcohol. He reports that he has current or past drug history.      Medications:  Prior to Admission medications    Not on File       Allergies:    Allergies   Allergen Reactions   • Nuts Unknown - High Severity       Objective   Objective     Vital Signs  Temp:  [98.1 °F (36.7 °C)-98.8 °F (37.1 °C)] 98.1 °F (36.7 °C)  Heart Rate:  [59-72] 72  Resp:  [20] 20  BP: (110-121)/(48-84) 116/74  SpO2:   [91 %-97 %] 97 %  on  Flow (L/min):  [5] 5;   Device (Oxygen Therapy): nasal cannula  Body mass index is 22.81 kg/m².    Physical Exam   Constitutional: He appears well-developed and well-nourished. No distress.   HENT:   Head: Normocephalic and atraumatic.   Nose: Nose normal.   Mouth/Throat: No oropharyngeal exudate.   Eyes: Pupils are equal, round, and reactive to light. Conjunctivae and EOM are normal.   Neck: Normal range of motion.   Cardiovascular: Normal rate, regular rhythm, normal heart sounds and intact distal pulses.   S1, S2 audible.   Pulmonary/Chest: Effort normal and breath sounds normal. No respiratory distress. He has no wheezes. He has no rales.   On 5 L nasal cannula.   Abdominal: Soft. Bowel sounds are normal. He exhibits no distension. There is no tenderness.   Musculoskeletal: Normal range of motion. He exhibits no edema, tenderness or deformity.   Neurological: He is alert. No cranial nerve deficit.   Skin: Skin is warm. Rash (on torso, arms and legs) noted. He is not diaphoretic. No erythema.   Psychiatric:   Patient is nonverbal with history of dementia   Nursing note and vitals reviewed.      Results Review:  I have personally reviewed most recent cardiac tracings, lab results and radiology images and interpretations and agree with findings.    Results from last 7 days   Lab Units 08/18/20 2018   WBC 10*3/mm3 8.20   HEMOGLOBIN g/dL 11.7*   HEMATOCRIT % 37.8   PLATELETS 10*3/mm3 266     Results from last 7 days   Lab Units 08/18/20 2018   SODIUM mmol/L 143   POTASSIUM mmol/L 4.2   CHLORIDE mmol/L 104   CO2 mmol/L 29.0   BUN  23   CREATININE mg/dL 0.98   GLUCOSE mg/dL 143*   CALCIUM mg/dL 9.6   ALT (SGPT) U/L 21   AST (SGOT) U/L 25     Estimated Creatinine Clearance: 64.6 mL/min (by C-G formula based on SCr of 0.98 mg/dL).  Brief Urine Lab Results     None          Microbiology Results (last 10 days)     ** No results found for the last 240 hours. **          ECG/EMG Results (most recent)      None                    No radiology results for the last 7 days      Estimated Creatinine Clearance: 64.6 mL/min (by C-G formula based on SCr of 0.98 mg/dL).    Assessment/Plan   Assessment/Plan       Active Hospital Problems    Diagnosis  POA   • **Status post PICC central line placement [Z95.828]  Not Applicable   • Attention to G-tube (CMS/[facility]) [Z43.1]  Not Applicable   • Type 2 diabetes mellitus (CMS/[facility]) [E11.9]  Yes   • Atrial fibrillation (CMS/[facility]) [I48.91]  Yes   • Tracheostomy status (CMS/[facility]) [Z93.0]  Not Applicable   • CAD (coronary artery disease) [I25.10]  Yes   • History of coronary artery bypass graft [Z95.1]  Not Applicable   • Seizure disorder (CMS/[facility]) [G40.909]  Yes   • GERD (gastroesophageal reflux disease) [K21.9]  Yes   • Pacemaker [Z95.0]  Yes   • 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         Status post PICC line placement  -Patient had ripped out his midline, this was replaced in the ED     G-tube malfunction  -Patient had ripped a portion of his G-tube off prior to arrival  -The remaining portion of the G-tube was clamped in the ED  -GI consult  -IV fluids D5 half-normal saline at 100 mL/h ordered  -Nutrition consult    Rash of torso, arms and back  -Continue home mycolog    Right hip wound  - Patient is afebrile, pulse in the 60s, on 5 L nasal cannula 95% SPO2 and blood pressure normotensive.    -No medications given in ED.  -Wound care consult  -Check CBC, CMP  -Continue Zosyn    Tracheostomy in place  -Continue oxygen requirement  -Trach cares    Dementia  -Seemingly at baseline, patient is nonverbal    Atrial fibrillation, pacemaker  -Hold home Eliquis, Lovenox until G-tube is replaced  -Continue home amiodarone, Lopressor when G-tube is replaced    Chronic hypotension  -Continue home midodrine when G-tube is replaced    Diabetes mellitus type  2  -Hold home Humulin,   -Continue home Levemir  -SSI, Accu-Cheks 3 times daily before meals  -Check A1c    Seizure disorder  -Continue Keppra IV until G-tube is replaced  -Continue home Vimpat IV until G-tube is replaced    GERD  -Continue home PPI when G-tube is replaced    CAD, history of CABG    CVA with residual hemiplegia      VTE Prophylaxis -Lovenox  Mechanical Order History:     None      Pharmalogical Order History:     None          CODE STATUS:    Code Status and Medical Interventions:   Ordered at: 08/18/20 2007     Code Status:    CPR     Medical Interventions (Level of Support Prior to Arrest):    Full       This patient has been examined wearing appropriate Personal Protective Equipment and discussed with hospital infection control department. 08/19/20      I discussed the patient's findings and my recommendations with patient.        Electronically signed by BRAYDEN Lindo, 08/18/20, 6:48 PM.  [facility] Hospitalist Team          Electronically signed by Donavan España PA at 08/19/20 0046       {Outbreak/Travel/Exposure Documentation......;  Question Available Choices Patient Response   Outbreak Screen: Do you currently have a new onset of the following symptoms?        Fever/Chils, Cough, Shortness of air, Loss of taste or smell, No, Unknown  Unknown (08/18/20 1658)   Outbreak Screen: In the last 14 days, have you had contact with anyone who is ill, has show any of the symptoms listed above and/or has been diagnosis with the 2019 Novel Coronavirus? This includes any immediate household members but excludes any patients with whom you have been in contact within your normal work duties wearing proper PPE, if you are a healthcare worker.  Yes, No, Unknown              No (08/18/20 1628)   Outbreak Screen: Who was notified?    Free text  (not recorded)   Travel Screen: Have you traveled in the last month? If so, to what country have you traveled? If [place] what state? Yes, No, Unknown  List of all  countries  List of all States No (08/18/20 1658)  (not recorded)  (not recorded)   Infection Risk: Do you currently have the following symptoms?  (If cough is selected, the Tuberculosis Screen is performed.) Cough, Fever, Rash, No (!) Rash (08/18/20 1658)   Tuberculosis Screen: Do you have any of the following Tuberculosis Risks?  · Have you lived or spent time with anyone who had or may have TB?  · Have you lived in or visited any of the following areas for more than one month: [place], [place], [place], [place] or [place], the [place] or [place]?  · Do you have HIV/AIDS?  · Have you lived in or worked in a nursing home, homeless shelter, correctional facility, or substance abuse treatment facility?   · No    If Yes do you have any of the following symptoms? Yes responses display to the right    If Yes, symptoms listed are:  Cough greater than or equal to 3 weeks, Loss of appetite, Unexplained weight loss, Night sweats, Bloody sputum or hemoptysis, Hoarseness, Fever, Fatigue, Chest pain, No (not recorded)  (not recorded)   Exposure Screen: Have you been exposed to any of these contagious diseases in the last month? Measles, Chickenpox, Meningitis, Pertussis, Whooping Cough, No No (08/18/20 1658)       Current Facility-Administered Medications   Medication Dose Route Frequency Provider Last Rate Last Dose   • acetaminophen (TYLENOL) 160 MG/5ML solution 160 mg  160 mg Per G Tube Q4H PRN Donavan España PA       • acetaminophen (TYLENOL) tablet 650 mg  650 mg Oral Q4H PRN Donavan España PA        Or   • acetaminophen (TYLENOL) 160 MG/5ML solution 650 mg  650 mg Oral Q4H PRN Donavan España PA        Or   • acetaminophen (TYLENOL) suppository 650 mg  650 mg Rectal Q4H PRN Donavan España PA       • aluminum-magnesium hydroxide-simethicone (MAALOX MAX) 400-400-40 MG/5ML suspension 15 mL  15 mL Oral Q6H PRN Donavan España PA       • amiodarone (PACERONE) tablet 200 mg  200 mg Per G Tube Daily Donavan España PA   Stopped at 08/19/20  0951   • bisacodyl (DULCOLAX) suppository 10 mg  10 mg Rectal Daily PRN Donavan España PA       • dextrose 5 % and sodium chloride 0.45 % infusion  100 mL/hr Intravenous Continuous Donavan España  mL/hr at 08/19/20 0405 100 mL/hr at 08/19/20 0405   • diphenhydrAMINE (BENADRYL) 12.5 MG/5ML elixir 12.5 mg  12.5 mg Per G Tube 4x Daily PRN Donavan España PA       • enoxaparin (LOVENOX) syringe 40 mg  40 mg Subcutaneous Daily Donavan España PA   40 mg at 08/19/20 0004   • ferrous sulfate 300 (60 Fe) MG/5ML syrup 300 mg  300 mg Per G Tube Daily Donavan España PA   Stopped at 08/19/20 0952   • insulin glargine (LANTUS) injection 27 Units  27 Units Subcutaneous Q12H Donavan España PA   Stopped at 08/19/20 0953   • lacosamide (VIMPAT) 200 mg in sodium chloride 0.9 % 50 mL IVPB  200 mg Intravenous Q12H Donavan España PA   Stopped at 08/19/20 0225   • levETIRAcetam in NaCl 0.75% (KEPPRA) IVPB 1,000 mg  1,000 mg Intravenous Q12H Donavan España PA   Stopped at 08/19/20 0034   • LORazepam (ATIVAN) injection 2 mg  2 mg Intramuscular Daily PRN Donavan España PA       • magnesium hydroxide (MILK OF MAGNESIA) suspension 2400 mg/10mL 10 mL  10 mL Oral Daily PRN Donavan España PA       • Magnesium Sulfate 2 gram infusion - Mg less than or equal to 1.5 mg/dL  2 g Intravenous PRN Donavan España PA        Or   • Magnesium Sulfate 1 gram infusion - Mg 1.6-1.9 mg/dL  1 g Intravenous PRN Donavan España PA       • melatonin tablet 5 mg  5 mg Oral Nightly PRN Donavan España PA       • metoprolol tartrate (LOPRESSOR) tablet 25 mg  25 mg Per G Tube Q12H Donavan Espñaa PA       • midodrine (PROAMATINE) tablet 5 mg  5 mg Per G Tube BID AC Donavan España PA       • nitroglycerin (NITROSTAT) SL tablet 0.4 mg  0.4 mg Sublingual Q5 Min PRN Donavan España PA       • nystatin-triamcinolone (MYCOLOG II) 473947-0.1 UNIT/GM-% cream 1 application  1 application Topical Daily Donavan España PA       • ondansetron (ZOFRAN) tablet 4 mg  4 mg Oral Q6H PRN Donavan España PA        Or   • ondansetron (ZOFRAN)  injection 4 mg  4 mg Intravenous Q6H PRN Donavan España PA       • piperacillin-tazobactam (ZOSYN) IVPB 3.375 g in 100 mL NS  3.375 g Intravenous Q8H Donavan España PA   3.375 g at 08/19/20 0456   • potassium chloride (K-DUR,KLOR-CON) CR tablet 40 mEq  40 mEq Oral PRN Donavan España PA       • sennosides-docusate (PERICOLACE) 8.6-50 MG per tablet 1 tablet  1 tablet Oral BID Donavan España PA   Stopped at 08/19/20 0953   • sodium chloride 0.9 % flush 10 mL  10 mL Intravenous Q12H Mary Covington MD   10 mL at 08/19/20 0013   • sodium chloride 0.9 % flush 10 mL  10 mL Intravenous PRN Mary Covington MD       • sodium chloride 0.9 % flush 10 mL  10 mL Intravenous Q12H Donavan España PA   10 mL at 08/19/20 0013   • sodium chloride 0.9 % flush 10 mL  10 mL Intravenous PRN Donavan España PA       • zinc oxide 20 % ointment   Topical Daily Donavan España PA         Physician Progress Notes (most recent note)    No notes of this type exist for this encounter.         Consult Notes (most recent note)    No notes of this type exist for this encounter.         Physical Therapy Notes (most recent note)    No notes exist for this encounter.         Occupational Therapy Notes (most recent note)    No notes exist for this encounter.

## 2020-08-19 NOTE — CONSULTS
Nutrition Services    Patient Name:  Juan Ann  YOB: 1945  MRN: [id number]  Admit Date:  8/18/2020    Comments: Currently NPO with plan to replace G-tube. Once replaced and cleared for use, begin Novasource Renal @55mL/hour with 20mL/hour free water flush. Beneprotein packets 2 daily to meet elevated needs for wound healing.        *This assessment completed remotely with assistance from nursing communication and EMR documentation    Reason for Assessment                Reason for Assessment    Reason For Assessment 8/19/20: TF consult       Diagnosis H&P:   74 y.o. male presents to [facility] ER 8/18/2020 complaining of issues with his G-tube and midline since earlier today.  Patient is a resident at [facility].  Patient has a history of dementia and had pulled at and ripped a piece of his G-tube off.  Patient also had pulled out his midline.  Patient was sent to ER to have these replaced and/or fixed.  Patient has severe dementia and is nonverbal.    PMH includes severe dementia, Afib, CAD, GERD, DM, CVA.     Current Problems:   G-tube malfunction  -Patient had ripped a portion of his G-tube off prior to arrival  -The remaining portion of the G-tube was clamped in the ED  -GI consult  -IV fluids D5 half-normal saline at 100 mL/h ordered  -Nutrition consult     Rash of torso, arms and back  -Continue home mycolog     Right hip wound  - Patient is afebrile, pulse in the 60s, on 5 L nasal cannula 95% SPO2 and blood pressure normotensive.    -No medications given in ED.  -Wound care consult  -Check CBC, CMP  -Continue Zosyn     Tracheostomy in place  -Continue oxygen requirement  -Trach cares     Dementia  -Seemingly at baseline, patient is nonverbal     Atrial fibrillation, pacemaker  -Hold home Eliquis, Lovenox until G-tube is replaced  -Continue home amiodarone, Lopressor when G-tube is replaced     Chronic hypotension  -Continue home midodrine when G-tube is  replaced     Diabetes mellitus type 2  -Hold home Humulin,   -Continue home Levemir  -SSI, Accu-Cheks 3 times daily before meals  -Check A1c     Seizure disorder  -Continue Keppra IV until G-tube is replaced  -Continue home Vimpat IV until G-tube is replaced     GERD  -Continue home PPI when G-tube is replaced     CAD, history of CABG     CVA with residual hemiplegia         Nutrition/Diet History                Nutrition/Diet History    Narrative     8/19: Secure message with pt's RN who reports pt's G-tube is currently not useable. Plan for replacement today. Plan for continued aggressive medical care per wife.    Contacted patient's ECF for usual TF regimen as well as weight history.  RN states patient has been on Nepro for years (55mL/hour with 200mL every 4 hours water flush) with a stable weight in the 160's.       Functional Status Dependent     Food Allergies nuts     Factors Affecting Nutritional Intake  dementia, trach, G-tube dependent         Anthropometrics             Anthropometrics    Height 172.7cm, 68in    Weight 68kg, 150lb, 8/18/20       Admit Weight    Admit Weight 150lb       Ideal Body Weight (IBW)    Ideal Body Weight (IBW) 154lb    % Ideal Body Weight 97%       Usual Body Weight (UBW)    Usual Body Weight 160lb per nursing at [facility]    % Usual Body Weight 94%    Weight Hx  No wt history PTA in chart  Last weight at nursing on on 8/3/20 161.6       Body Mass Index (BMI)    BMI (kg/m2) 22.81           Labs/Medications                Labs    Pertinent Lab Results Comments Gluc 105-153 wnl to elev        Medications    Pertinent Medications Comments FeSO4, Lantus, Abx, IV fluids D5 @100mL/hour         Physical Findings                Physical Findings    Overall Physical Appearance Unable to observe r/t limiting face-to-face contact in context of the COVID19 pandemic      Edema  None documented     Gastrointestinal +BM 8/19     Tubes G-tube     Oral/Mouth Cavity Unknown baseline, pt  has been NPO for years     Skin L foot DTI, coccyx stage 2, R heel stage 1, R gluteal stage 3/4         Estimated/Assessed Needs              Calorie Requirements     Height Used for Calorie Calculations       Weight Used for Calorie Calculations 68kg     Formula chosen for Calorie Calculations  35kcal/kg     Estimated Calorie Requirements (kcals/day) 2380kcal/day             Protein Requirements    Weight Used For Protein Calculations 68kg      Est Protein Requirement Amount (gms/kg) 1.8g/kg      Estimated Protein Requirements (gms/day) 122g/day         Fluid Requirements     Estimated Fluid Requirements (mL/day) 1ml/kcal, 2380mL/day           Fluid Deficit       Current Sodium Level (mEq/L)      Desired Sodium Level (mEq/L)      Estimated Fluid Deficit Needs (L)           Nutrition Prescription Ordered                Nutrition Prescription PO    Current PO Diet  NPO     Supplement         Nutrition Prescription EN    Enteral Route -G-tube     TF modular -     TF Delivery Method -     Current Ordered TF  -     Current Ordered Water flush  -     TF Observation  -        Nutrition Prescription TPN    TPN Route -     Current Ordered TPN Volume  -     Dextrose (gm/kcals)  -     Amino Acid (gm/kcals) -     Lipids (ML/Concentration/Frequency)  -     TPN Observation  -          Evaluation of Received Nutrient/Fluid Intake                PO Evaluation    % PO Intake NPO        EN Evaluation    TF Changes -     TF Residual -     TF Tolerance      Average EN Delivered  -        TPN Evaluation    Total Number of Days on TPN  -           Clinical Course      Clinical Nutrition Course Details  NPO since admit         Problem/Interventions:                     Nutrition Diagnoses Problem 1      Problem 1   Inadequate oral intake related to PMH including trach as evidenced by chronic tube feeding as sole source nutrition.     Nutrition Diagnoses Problem 2      Problem 2            Intervention Goal                Intervention  Goal    General EN started.         Nutrition Intervention                Nutrition Intervention    RD/Tech Action  Assess TF needs/recs         Nutrition Prescription                Nutrition Prescription PO      Diet  NPO     Supplement         Nutrition Prescription EN    Enteral Prescription -Goal EN:  Novasource Renal @55ml/hour with 20mL/hour free water flush over 22 hours to account for ADL's to provide 2420kcal (102%), 110g protein (90%- additional protein packets to be added), 871mL free water (1311mL with flushes).    Beneprotein BID provides 50kcal and 12g PRO    Monitor d/c of IV fluids for increase in free water flush.          Nutrition Prescription TPN    TPN Prescription -         Monitor/Evaluation                Monitor/Evaluation    Monitor Start of EN, tolerance, BM, skin, labs           Electronically signed by:  Janiya Machuca RD  08/19/20 09:51

## 2020-08-19 NOTE — CONSULTS
GI CONSULT  NOTE:    Referring Provider:  DR. SIMPSON    Chief complaint: G-tube malfunction    Subjective .     History of present illness: Juan Ann is a 74 y.o. male with past medical history including dementia, tracheostomy, A. fib, CAD s/p CABG, GERD, permanent pacemaker placement, diabetes, seizures, an CVA who presented to the hospital on 8/18 from [facility] for G-tube malfunction.  Chart review reveals that patient has a history of dementia and pulled out the PEG tube and broke the end off.  PEG tube is been clamped but remains in place.  He also pulled out his midline which was in place for antibiotics for hip wound.  Upon exam, patient is awake but nonverbal.  No family present so HPI has been supplemented via chart review and via bedside RN.  Per RN report, patient has not had any obvious GI complaints.  No bowel movements since admission.  He does have a trach and is on room air.     Endo History:  6/2018 EGD with PEG tube replacement (Dr. Bacon)-small hiatal hernia, successful replacement of PEG tube    Past Medical History:  Past Medical History:   Diagnosis Date   • Atrial fibrillation (CMS/Formerly [facility]) 8/18/2020   • CAD (coronary artery disease) 8/18/2020   • GERD (gastroesophageal reflux disease) 8/18/2020   • Hemiplegia of dominant side as late effect following cerebrovascular disease (CMS/Formerly [facility]) 8/18/2020   • History of coronary artery bypass graft 8/18/2020   • Pacemaker 8/18/2020   • Seizure disorder (CMS/Formerly [facility]) 8/18/2020   • Tracheostomy status (CMS/Formerly [facility]) 8/18/2020   • Type 2 diabetes mellitus (CMS/[facility]ly [facility]) 8/19/2020       Past Surgical History:  Past Surgical History:   Procedure Laterality Date   • CARDIAC SURGERY         Social History:  Social History     Tobacco Use   • Smoking status: Unknown If Ever Smoked   Substance Use Topics   • Alcohol use: Not Currently   • Drug use: Not Currently       Family History:  Family History   Family history unknown: Yes        Medications:  Medications Prior to Admission   Medication Sig Dispense Refill Last Dose   • acetaminophen (TYLENOL) 160 MG/5ML solution 160 mg by Per G Tube route Every 4 (Four) Hours As Needed for Fever.      • amiodarone (PACERONE) 200 MG tablet 200 mg by Per G Tube route Daily.      • apixaban (ELIQUIS) 5 MG tablet tablet 5 mg by Per G Tube route 2 (Two) Times a Day.      • diphenhydrAMINE (BENADRYL) 12.5 MG/5ML elixir 12.5 mg by Per G Tube route 4 (Four) Times a Day As Needed for Allergies.      • ferrous sulfate 300 (60 Fe) MG/5ML syrup 300 mg by Per G Tube route Every Morning.      • insulin detemir (LEVEMIR) 100 UNIT/ML injection Inject 27 Units under the skin into the appropriate area as directed 2 (Two) Times a Day.      • insulin regular (humuLIN R,novoLIN R) 100 UNIT/ML injection Inject  under the skin into the appropriate area as directed Every 6 (Six) Hours. Sliding Scale:  0-69= 0 units  = 0 units  151-200= 1 unit  201-250= 2 units  251-300= 3 units  301-350= 4 units  351-400= 5 units  401+ = Call Prescriber      • lacosamide (VIMPAT) 10 MG/ML solution oral solution 200 mg by Per G Tube route Every 12 (Twelve) Hours.      • levETIRAcetam (KEPPRA) 100 MG/ML solution 1,000 mg by Per G Tube route 2 (Two) Times a Day.      • LORazepam (ATIVAN) 2 MG/ML injection Inject 2 mg into the appropriate muscle as directed by prescriber As Needed for Seizures.      • metoclopramide (REGLAN) 5 MG/5ML solution 5 mg by Per G Tube route 4 (Four) Times a Day.      • metoprolol tartrate (LOPRESSOR) 25 MG tablet 25 mg by Per G Tube route 2 (Two) Times a Day.      • midodrine (PROAMATINE) 5 MG tablet 5 mg by Per G Tube route 2 (two) times a day.      • nystatin-triamcinolone (MYCOLOG II) 596896-2.1 UNIT/GM-% cream Apply 1 application topically to the appropriate area as directed Daily. Apply to arms, back, and torso       • omeprazole (PriLOSEC) 5 mg/mL suspension 40 mg by Per G Tube route Every Morning.      •  ondansetron (ZOFRAN) 4 MG/5ML solution 4 mg by Per G Tube route Every 8 (Eight) Hours As Needed for Nausea or Vomiting.      • piperacillin-tazobactam (ZOSYN) IVPB 3.375 g in 100 mL NS Infuse 3.375 g into a venous catheter 3 (Three) Times a Day.      • sennosides-docusate (senna-docusate sodium) 8.6-50 MG per tablet Take 1 tablet by mouth 2 (two) times a day.      • zinc oxide (DESITIN) 40 % paste paste Apply 1 application topically to the appropriate area as directed Daily. Apply to buttocks       • insulin lispro (humaLOG) 100 UNIT/ML injection Inject 7 Units under the skin into the appropriate area as directed Every 6 (Six) Hours.   Unknown at Unknown time       Scheduled Meds:  amiodarone 200 mg Per G Tube Daily   Beneprotein 1 packet Per G Tube BID   enoxaparin 40 mg Subcutaneous Daily   ferrous sulfate 300 mg Per G Tube Daily   insulin glargine 27 Units Subcutaneous Q12H   lacosamide (VIMPAT) IVPB 200 mg Intravenous Q12H   levETIRAcetam 1,000 mg Intravenous Q12H   metoprolol tartrate 25 mg Per G Tube Q12H   midodrine 5 mg Per G Tube BID AC   nystatin-triamcinolone 1 application Topical Daily   piperacillin-tazobactam (ZOSYN) IVPB 3.375 g in 100 mL NS 3.375 g Intravenous Q8H   sennosides-docusate 1 tablet Oral BID   sodium chloride 10 mL Intravenous Q12H   sodium chloride 10 mL Intravenous Q12H   sodium hypochlorite  Topical Daily   zinc oxide  Topical Daily     Continuous Infusions:  dextrose 5 % and sodium chloride 0.45 % 100 mL/hr Last Rate: 100 mL/hr (08/19/20 0405)     PRN Meds:.•  acetaminophen  •  acetaminophen **OR** acetaminophen **OR** acetaminophen  •  aluminum-magnesium hydroxide-simethicone  •  bisacodyl  •  diphenhydrAMINE  •  LORazepam  •  magnesium hydroxide  •  magnesium sulfate **OR** magnesium sulfate in D5W 1g/100mL (PREMIX)  •  melatonin  •  nitroglycerin  •  ondansetron **OR** ondansetron  •  potassium chloride  •  sodium chloride  •  sodium chloride    ALLERGIES:  Nuts    ROS:  Unable to  complete review of systems due to patient status/nonverbal    Objective Resting in bed, NAD, no family present    Vital Signs:   Vitals:    08/18/20 1811 08/18/20 1818 08/18/20 2023 08/19/20 0420   BP: 121/84 121/84 116/74 144/79   BP Location:   Right arm Right arm   Patient Position:   Lying Lying   Pulse: 59 59 72 60   Resp:  20 20 19   Temp:  98.8 °F (37.1 °C) 98.1 °F (36.7 °C) 97.5 °F (36.4 °C)   TempSrc:   Axillary Axillary   SpO2: 94% 94% 97% 99%   Weight:       Height:           Physical Exam:       General Appearance:    Awake, in no acute distress, nonverbal   Head:    Normocephalic, without obvious abnormality, atraumatic   Throat:   No oral lesions, no thrush, oral mucosa moist   Lungs:     Respirations regular, even and unlabored, trach on room air   Chest Wall:    No abnormalities observed   Abdomen:     Soft, no obvious tenderness, PEG tube in place with the end removed, clamped, non-distended, no hepatosplenomegaly   Rectal:     Deferred   Extremities:   Moves all extremities, no edema, no cyanosis   Pulses:   Pulses palpable and equal bilaterally   Skin:   No rash, no jaundice, normal palpation       Neurologic:   Cranial nerves 2 - 12 grossly intact       Results Review:   I reviewed the patient's labs and imaging.  CBC    Results from last 7 days   Lab Units 08/19/20 0445 08/18/20 2018   WBC 10*3/mm3 7.50 8.20   HEMOGLOBIN g/dL 11.1* 11.7*   PLATELETS 10*3/mm3 236 266     CMP Results from last 7 days   Lab Units 08/19/20 0445 08/18/20 2018   SODIUM mmol/L 142 143   POTASSIUM mmol/L 4.1 4.2   CHLORIDE mmol/L 104 104   CO2 mmol/L 27.0 29.0   BUN  20 23   CREATININE mg/dL 0.93 0.98   GLUCOSE mg/dL 153* 143*   ALBUMIN g/dL  --  3.30*   BILIRUBIN mg/dL  --  0.2   ALK PHOS U/L  --  69   AST (SGOT) U/L  --  25   ALT (SGPT) U/L  --  21   MAGNESIUM mg/dL 2.1  --      Cr Clearance Estimated Creatinine Clearance: 68 mL/min (by C-G formula based on SCr of 0.93 mg/dL).  Coag     HbA1C   Lab Results    Component Value Date    HGBA1C 8.0 (H) 05/18/2020     Blood Glucose   Glucose   Date/Time Value Ref Range Status   08/19/2020 0702 105 70 - 105 mg/dL Final     Comment:     Serial Number: [id number]:  [id number]   08/18/2020 2119 111 (H) 70 - 105 mg/dL Final     Comment:     Serial Number: [id number]:  [id number]     Infection     UA      Radiology(recent) No radiology results for the last day       ASSESSMENT  -G-tube malfunction-patient has dementia and pulled the end of his G-tube  -History of CVA/dementia- baseline neurological status nonverbal  -chronic tracheostomy  -Mild normocytic anemia  -History of A. Fib/Permanent pacemaker placement-on Eliquis  -CAD s/p CABG  -GERD    PLAN  Our service was asked to consult for G-tube malfunction.  Patient has history of dementia and pulled off the end of his G-tube.  Will replace at bedside with 20 [place] G-tube  Labs reviewed, LFTs normal, creatinine normal at 0.93, white blood cell count normal at 7.5, hemoglobin stable at 11.1 (chart review reveals hemoglobin 9.7 and 6/20/2020)  Continue supportive care    I discussed the patients findings and my recommendations with the patient.    We appreciate the referral    YUKO Lazaro  08/19/20  12:58

## 2020-08-19 NOTE — PROGRESS NOTES
"      [facility] Medicine Services Daily Progress Note      Hospitalist Team  LOS 0 days      Patient Care Team:  Angeles Figueroa MD as PCP - General    Patient Location: 364/1      Subjective   Subjective     Chief Complaint / Subjective  Chief Complaint   Patient presents with   • medical device problem         Brief Synopsis of Hospital Course/HPI    The patient is a 74 y.o. 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  The patient was sent from [facility] to the ED on 8/18/2020 after he pulled his G-tube and midline on 8/18/2020 .    The patient is unable to provide history.    Date::      8/19/20: afebrile.  Nonverbal.  GI consulted.    Review of Systems   All other systems reviewed and are negative.        Objective   Objective      Vital Signs  Temp:  [97.5 °F (36.4 °C)-98.8 °F (37.1 °C)] 97.5 °F (36.4 °C)  Heart Rate:  [59-72] 60  Resp:  [19-20] 19  BP: (110-144)/(48-84) 144/79  Oxygen Therapy  SpO2: 99 %  Pulse Oximetry Type: Intermittent  Device (Oxygen Therapy): nasal cannula  Device (Oxygen Therapy): nasal cannula  Flow (L/min): 4  Flowsheet Rows      First Filed Value   Admission Height  172.7 cm (68\") Documented at 08/18/2020 1659   Admission Weight  68 kg (150 lb) Documented at 08/18/2020 1659        Intake & Output (last 3 days)       08/16 0701 - 08/17 0700 08/17 0701 - 08/18 0700 08/18 0701 - 08/19 0700 08/19 0701 - 08/20 0700    Urine (mL/kg/hr)   850     Stool   0     Total Output   850     Net   -850             Stool Unmeasured Occurrence   2 x         Lines, Drains & Airways    Active LDAs     Name:   Placement date:   Placement time:   Site:   Days:    Midline Catheter - Single Lumen 08/18/20 Left Basilic   08/18/20    1745     less than 1    Gastrostomy/Enterostomy LUQ   08/18/20 unknown    2000    LUQ   less than 1    " Urethral Catheter   08/18/20 2000     less than 1    Surgical Airway   08/18/20 unknown    2000    --   less than 1                  Physical Exam:    Physical Exam   Constitutional:   Nonverbal   HENT:   Head: Normocephalic.   Eyes: No scleral icterus.   Neck: No thyroid mass present.       Cardiovascular: Normal rate and regular rhythm.   Pulmonary/Chest: Effort normal. He has decreased breath sounds in the right lower field and the left lower field.   Abdominal: Bowel sounds are normal. There is no tenderness.       Musculoskeletal:   Flexed hip and not following commands   Lymphadenopathy:     He has no cervical adenopathy.   Neurological:   Not following commands.  Opens his eyes.  Nonverbal   Skin: Skin is warm and dry.        Psychiatric:   Not following commands.  Opens his eyes.  Nonverbal            Wounds (last 24 hours)      LDA Wound     Row Name 08/19/20 0750 08/18/20 2028 08/18/20 2026       Wound 08/18/20 2020 Right gluteal Pressure Injury    Wound - Properties Group Date first assessed: 08/18/20  -RR Time first assessed: 2020  -RR Present on Hospital Admission: Y  -RR Side: Right  -RR Location: gluteal  -RR Primary Wound Type: Pressure inj  -RR Stage, Pressure Injury: Stage 3;Stage 4  -RR    Dressing Appearance  dry;intact  -BB  --  --    Closure  Adhesive bandage  -BB  --  --    Base  white;yellow;moist  -BB  --  --       Wound 08/18/20 2022 Right heel Pressure Injury    Wound - Properties Group Date first assessed: 08/18/20  -RR Time first assessed: 2022  -RR Present on Hospital Admission: Y  -RR Side: Right  -RR Location: heel  -RR Primary Wound Type: Pressure inj  -RR Stage, Pressure Injury: Stage 1  -RR    Dressing Appearance  open to air  -BB  --  --    Closure  Open to air  -BB  --  --    Base  pink;non-blanchable  -BB  --  --    Periwound  intact;redness  -BB  --  --    Periwound Temperature  warm  -BB  --  --    Drainage Amount  none  -BB  --  --       Wound 08/18/20 2026 coccyx Pressure  Injury    Wound - Properties Group Date first assessed: 08/18/20  -RR Time first assessed: 2026  -RR Present on Hospital Admission: Y  -RR Location: coccyx  -RR Primary Wound Type: Pressure inj  -RR Stage, Pressure Injury: Stage 1;Stage 2  -RR    Wound Image  --  --  Images linked: 1  -RR    Dressing Appearance  open to air  -BB  --  --    Closure  Open to air  -BB  --  --    Periwound  redness  -BB  --  --    Periwound Temperature  warm  -BB  --  --    Periwound Care, Wound  barrier ointment applied  -BB  --  --       Wound 08/18/20 2028 Left anterior foot Pressure Injury    Wound - Properties Group Date first assessed: 08/18/20  -RR Time first assessed: 2028  -RR Present on Hospital Admission: Y  -RR Side: Left  -RR Orientation: anterior  -RR Location: foot  -RR Primary Wound Type: Pressure inj  -RR Stage, Pressure Injury: deep tissue injury  -RR    Wound Image  --  Images linked: 1  -RR  --    Dressing Appearance  open to air  -BB  --  --    Closure  Open to air  -BB  --  --    Drainage Amount  none  -BB  --  --    Row Name 08/18/20 2023 08/18/20 2021 08/18/20 1945       Wound 08/18/20 2020 Right gluteal Pressure Injury    Wound - Properties Group Date first assessed: 08/18/20  -RR Time first assessed: 2020 -RR Present on Hospital Admission: Y  -RR Side: Right  -RR Location: gluteal  -RR Primary Wound Type: Pressure inj  -RR Stage, Pressure Injury: Stage 3;Stage 4  -RR    Wound Image  --  Images linked: 2  -RR  --    Periwound  --  --  intact;dry;redness  -RR    Periwound Temperature  --  --  warm  -RR    Dressing Care, Wound  --  --  dressing changed  -RR       Wound 08/18/20 2022 Right heel Pressure Injury    Wound - Properties Group Date first assessed: 08/18/20  -RR Time first assessed: 2022  -RR Present on Hospital Admission: Y  -RR Side: Right  -RR Location: heel  -RR Primary Wound Type: Pressure inj  -RR Stage, Pressure Injury: Stage 1  -RR    Wound Image  Images linked: 1  -RR  --  --    Dressing  Appearance  --  --  open to air  -RR    Closure  --  --  Open to air  -RR    Base  --  --  non-blanchable  -RR    Periwound  --  --  intact;redness  -RR    Periwound Temperature  --  --  warm  -RR    Drainage Amount  --  --  none  -RR       Wound 08/18/20 2026 coccyx Pressure Injury    Wound - Properties Group Date first assessed: 08/18/20  -RR Time first assessed: 2026  -RR Present on Hospital Admission: Y  -RR Location: coccyx  -RR Primary Wound Type: Pressure inj  -RR Stage, Pressure Injury: Stage 1;Stage 2  -RR    Dressing Appearance  --  --  open to air  -RR    Closure  --  --  Open to air  -RR    Periwound  --  --  redness  -RR    Periwound Temperature  --  --  warm  -RR    Periwound Care, Wound  --  --  barrier ointment applied  -RR       Wound 08/18/20 2028 Left anterior foot Pressure Injury    Wound - Properties Group Date first assessed: 08/18/20  -RR Time first assessed: 2028  -RR Present on Hospital Admission: Y  -RR Side: Left  -RR Orientation: anterior  -RR Location: foot  -RR Primary Wound Type: Pressure inj  -RR Stage, Pressure Injury: deep tissue injury  -RR    Drainage Amount  --  --  none  -RR    Dressing Care, Wound  --  --  dressing changed  -RR      User Key  (r) = Recorded By, (t) = Taken By, (c) = Cosigned By    Initials Name Provider Type    RR Kin Wadsworth RN Registered Nurse    Bernarda Maxwell RN Registered Nurse          Procedures:              Results Review:     I reviewed the patient's new clinical results.      Lab Results (last 24 hours)     Procedure Component Value Units Date/Time    BUN [[id number]]  (Normal) Collected:  08/19/20 0445    Specimen:  Blood Updated:  08/19/20 1052     BUN 20 mg/dL     POC Glucose Once [[id number]]  (Normal) Collected:  08/19/20 0702    Specimen:  Blood Updated:  08/19/20 0702     Glucose 105 mg/dL      Comment: Serial Number: [id number]:  [id number]       Basic Metabolic Panel [[id number]]  (Abnormal) Collected:  08/19/20 0445     Specimen:  Blood Updated:  08/19/20 0557     Glucose 153 mg/dL      BUN --     Comment: Testing performed by alternate method        Creatinine 0.93 mg/dL      Sodium 142 mmol/L      Potassium 4.1 mmol/L      Comment: Specimen hemolyzed.  Results may be affected.        Chloride 104 mmol/L      CO2 27.0 mmol/L      Calcium 9.0 mg/dL      eGFR Non African Amer 80 mL/min/1.73      BUN/Creatinine Ratio --     Comment: Testing not performed        Anion Gap 11.0 mmol/L     Narrative:       GFR Normal >60  Chronic Kidney Disease <60  Kidney Failure <15      Magnesium [[id number]]  (Normal) Collected:  08/19/20 0445    Specimen:  Blood Updated:  08/19/20 0557     Magnesium 2.1 mg/dL     CBC Auto Differential [[id number]]  (Abnormal) Collected:  08/19/20 0445    Specimen:  Blood Updated:  08/19/20 0525     WBC 7.50 10*3/mm3      RBC 4.12 10*6/mm3      Hemoglobin 11.1 g/dL      Hematocrit 34.2 %      MCV 83.1 fL      MCH 26.9 pg      MCHC 32.4 g/dL      RDW 15.9 %      RDW-SD 46.8 fl      MPV 9.8 fL      Platelets 236 10*3/mm3      Neutrophil % 64.6 %      Lymphocyte % 22.3 %      Monocyte % 6.0 %      Eosinophil % 6.0 %      Basophil % 1.1 %      Neutrophils, Absolute 4.90 10*3/mm3      Lymphocytes, Absolute 1.70 10*3/mm3      Monocytes, Absolute 0.50 10*3/mm3      Eosinophils, Absolute 0.50 10*3/mm3      Basophils, Absolute 0.10 10*3/mm3      nRBC 0.1 /100 WBC     BUN [[id number]]  (Normal) Collected:  08/18/20 2018    Specimen:  Blood Updated:  08/18/20 2154     BUN 23 mg/dL     Comprehensive Metabolic Panel [[id number]]  (Abnormal) Collected:  08/18/20 2018    Specimen:  Blood Updated:  08/18/20 2148     Glucose 143 mg/dL      BUN --     Comment: Testing performed by alternate method        Creatinine 0.98 mg/dL      Sodium 143 mmol/L      Potassium 4.2 mmol/L      Chloride 104 mmol/L      CO2 29.0 mmol/L      Calcium 9.6 mg/dL      Total Protein 7.6 g/dL      Albumin 3.30 g/dL      ALT (SGPT) 21 U/L      AST (SGOT) 25 U/L       Alkaline Phosphatase 69 U/L      Total Bilirubin 0.2 mg/dL      eGFR Non African Amer 75 mL/min/1.73      Globulin 4.3 gm/dL      A/G Ratio 0.8 g/dL      BUN/Creatinine Ratio --     Comment: Testing not performed        Anion Gap 10.0 mmol/L     Narrative:       GFR Normal >60  Chronic Kidney Disease <60  Kidney Failure <15      POC Glucose Once [[id number]]  (Abnormal) Collected:  08/18/20 2119    Specimen:  Blood Updated:  08/18/20 2120     Glucose 111 mg/dL      Comment: Serial Number: [id number]:  [id number]       CBC & Differential [[id number]] Collected:  08/18/20 2018    Specimen:  Blood Updated:  08/18/20 2103    Narrative:       The following orders were created for panel order CBC & Differential.  Procedure                               Abnormality         Status                     ---------                               -----------         ------                     CBC Auto Differential[[id number]]        Abnormal            Final result                 Please view results for these tests on the individual orders.    CBC Auto Differential [[id number]]  (Abnormal) Collected:  08/18/20 2018    Specimen:  Blood Updated:  08/18/20 2103     WBC 8.20 10*3/mm3      RBC 4.45 10*6/mm3      Hemoglobin 11.7 g/dL      Hematocrit 37.8 %      MCV 84.9 fL      MCH 26.2 pg      MCHC 30.9 g/dL      RDW 16.0 %      RDW-SD 48.6 fl      MPV 9.3 fL      Platelets 266 10*3/mm3      Neutrophil % 70.6 %      Lymphocyte % 18.0 %      Monocyte % 4.6 %      Eosinophil % 5.9 %      Basophil % 0.9 %      Neutrophils, Absolute 5.80 10*3/mm3      Lymphocytes, Absolute 1.50 10*3/mm3      Monocytes, Absolute 0.40 10*3/mm3      Eosinophils, Absolute 0.50 10*3/mm3      Basophils, Absolute 0.10 10*3/mm3      nRBC 0.1 /100 WBC         No results found for: HGBA1C            No results found for: LIPASE  Lab Results   Component Value Date    CHLPL <50 05/18/2020    TRIG 58 05/18/2020    HDL 6 (L) 05/18/2020    LDL UNABLE TO CALCULATE  05/18/2020       No results found for: INTRAOP, PREDX, FINALDX, COMDX    Microbiology Results (last 10 days)     ** No results found for the last 240 hours. **          ECG/EMG Results (most recent)     None                    No radiology results for the last 7 days        Xrays, labs reviewed personally by physician.    Medication Review:   I have reviewed the patient's current medication list      Scheduled Meds    amiodarone 200 mg Per G Tube Daily   Beneprotein 1 packet Per G Tube BID   enoxaparin 40 mg Subcutaneous Daily   ferrous sulfate 300 mg Per G Tube Daily   insulin glargine 27 Units Subcutaneous Q12H   lacosamide (VIMPAT) IVPB 200 mg Intravenous Q12H   levETIRAcetam 1,000 mg Intravenous Q12H   metoprolol tartrate 25 mg Per G Tube Q12H   midodrine 5 mg Per G Tube BID AC   nystatin-triamcinolone 1 application Topical Daily   piperacillin-tazobactam (ZOSYN) IVPB 3.375 g in 100 mL NS 3.375 g Intravenous Q8H   sennosides-docusate 1 tablet Oral BID   sodium chloride 10 mL Intravenous Q12H   sodium chloride 10 mL Intravenous Q12H   sodium hypochlorite  Topical Daily   zinc oxide  Topical Daily       Meds Infusions    dextrose 5 % and sodium chloride 0.45 % 100 mL/hr Last Rate: 100 mL/hr (08/19/20 0405)       Meds PRN  •  acetaminophen  •  acetaminophen **OR** acetaminophen **OR** acetaminophen  •  aluminum-magnesium hydroxide-simethicone  •  bisacodyl  •  diphenhydrAMINE  •  LORazepam  •  magnesium hydroxide  •  magnesium sulfate **OR** magnesium sulfate in D5W 1g/100mL (PREMIX)  •  melatonin  •  nitroglycerin  •  ondansetron **OR** ondansetron  •  potassium chloride  •  sodium chloride  •  sodium chloride        Assessment/Plan   Assessment/Plan     Active Hospital Problems    Diagnosis  POA   • **Status post PICC central line placement [Z95.828]  Not Applicable   • Attention to G-tube (CMS/HCC) [Z43.1]  Not Applicable   • Type 2 diabetes mellitus (CMS/HCC) [E11.9]  Yes   • Atrial fibrillation (CMS/HCC)  [I48.91]  Yes   • Tracheostomy status (CMS/[facility]) [Z93.0]  Not Applicable   • CAD (coronary artery disease) [I25.10]  Yes   • History of coronary artery bypass graft [Z95.1]  Not Applicable   • Seizure disorder (CMS/[facility]) [G40.909]  Yes   • GERD (gastroesophageal reflux disease) [K21.9]  Yes   • Pacemaker [Z95.0]  Yes   • 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       MEDICAL DECISION MAKING COMPLEXITY BY PROBLEM:     S/P PICC line placement in ED       G-tube malfunction:  -GI consulted  -Nutrition consult     Rash of torso, arms and back  -Continue home mycolog     Right hip wound  - Patient is afebrile, pulse in the 60s, on 5 L nasal cannula 95% SPO2 and blood pressure normotensive.    -No medications given in ED.  -Wound care consult    Chronic hypoxemic respiratory failure s/p tracheostomy   -Titrate pulse ox>92%    Intracranial hemorrhage residual hemiplegia with persistent vegetative state  -Seemingly at baseline, patient is nonverbal     Atrial fibrillation  -Hold home Eliquis, Lovenox until G-tube is replaced  -Continue home amiodarone, Lopressor when G-tube is replaced     Chronic hypotension  -Continue home midodrine when G-tube is replaced     Diabetes mellitus type 2  -Hold home Humulin,   -Continue home Levemir     Seizure disorder  -Continue Keppra IV until G-tube is replaced  -Continue home Vimpat IV until G-tube is replaced     GERD  -Continue home PPI when G-tube is replaced     CAD s/p CABG         VTE Prophylaxis -   Mechanical Order History:     None      Pharmalogical Order History:     Ordered     Dose Route Frequency Stop    08/18/20 2007  enoxaparin (LOVENOX) syringe 40 mg      40 mg SC Daily --            Code Status -   Code Status and Medical Interventions:   Ordered at: 08/18/20 2007     Code Status:    CPR     Medical Interventions (Level of Support  Prior to Arrest):    Full       This patient has been examined wearing appropriate Personal Protective Equipment  08/19/20        Electronically signed by Vinh Queen DO, 08/19/20, 14:03.  [facility] ([facility] Hospitalist Team

## 2020-08-19 NOTE — CONSULTS
Palliative Care Consultation    Patient Code Status    Code Status and Medical Interventions:   Ordered at: 08/18/20 2007     Code Status:    CPR     Medical Interventions (Level of Support Prior to Arrest):    Full       Requesting clinician:  Consulting Physician(s)     Provider Relationship Specialty    Julieta Dasilva MD Consulting Physician Gastroenterology        Reason for consult: Consultation for clarification of goals of care and code status.      Chief Complaint  Pulled out G tube      History of Present Illness    Juan Ann is a 74 y.o. male who presented to the ED on 8/18/20 from [facility] after pulling out his G tube and midline IV.  Patient has advanced dementia and is nonverbal. Noted history of previous tracheostomy.   Patient not verbally responsive to stimulation. Eyes open and looking around the room but does not follow commands. Patient pulling with his left hand and attempting to reach his G tube which is under ABD binder. Left arm/hand restrained.   with palliative care team called spouse and conversation regarding goals of care and wife wants to continue to proceed with all aggressive treatments        Advanced Care Planning    Advanced Directives: (CAMILO)  Health Care Directive on file: No  Health Care Surrogate:      Comments: noted in the EMR    Assessment/Plan   Pulled off piece of G tube /pending replacement  Pulled out midline IV/replaced with PICC on admission  Advanced dementia at [facility]  Chronic atrial fib / pacemaker  GERD  History of previous CVA with hemiplegia  Seizures  Diabetes mellitus type 2  Chart review shows history of ETOH / drug use  Principal Problem:    Status post PICC central line placement  Active Problems:    History of coronary artery bypass graft    Tracheostomy status (CMS/HCC)    Atrial fibrillation (CMS/HCC)    CAD (coronary artery disease)    Seizure disorder (CMS/HCC)    GERD (gastroesophageal reflux disease)    Hemiplegia of dominant side as  late effect following cerebrovascular disease (CMS/[facility])    Pacemaker    Attention to G-tube (CMS/HCC)    Type 2 diabetes mellitus (CMS/HCC)      Plan    Continue with aggressive therapy  Emotional support provided    Review of Systems   Unable to perform ROS: Dementia         Past Medical History:   Diagnosis Date   • Atrial fibrillation (CMS/HCC) 8/18/2020   • CAD (coronary artery disease) 8/18/2020   • GERD (gastroesophageal reflux disease) 8/18/2020   • Hemiplegia of dominant side as late effect following cerebrovascular disease (CMS/HCC) 8/18/2020   • History of coronary artery bypass graft 8/18/2020   • Pacemaker 8/18/2020   • Seizure disorder (CMS/HCC) 8/18/2020   • Tracheostomy status (CMS/HCC) 8/18/2020   • Type 2 diabetes mellitus (CMS/HCC) 8/19/2020     Past Surgical History:   Procedure Laterality Date   • CARDIAC SURGERY       Family History   Family history unknown: Yes     Social History     Tobacco Use   • Smoking status: Unknown If Ever Smoked   Substance Use Topics   • Alcohol use: Not Currently   • Drug use: Not Currently     Medications Prior to Admission   Medication Sig Dispense Refill Last Dose   • acetaminophen (TYLENOL) 160 MG/5ML solution 160 mg by Per G Tube route Every 4 (Four) Hours As Needed for Fever.      • amiodarone (PACERONE) 200 MG tablet 200 mg by Per G Tube route Daily.      • apixaban (ELIQUIS) 5 MG tablet tablet 5 mg by Per G Tube route 2 (Two) Times a Day.      • diphenhydrAMINE (BENADRYL) 12.5 MG/5ML elixir 12.5 mg by Per G Tube route 4 (Four) Times a Day As Needed for Allergies.      • ferrous sulfate 300 (60 Fe) MG/5ML syrup 300 mg by Per G Tube route Every Morning.      • insulin detemir (LEVEMIR) 100 UNIT/ML injection Inject 27 Units under the skin into the appropriate area as directed 2 (Two) Times a Day.      • insulin regular (humuLIN R,novoLIN R) 100 UNIT/ML injection Inject  under the skin into the appropriate area as directed Every 6 (Six) Hours. Sliding  Scale:  0-69= 0 units  = 0 units  151-200= 1 unit  201-250= 2 units  251-300= 3 units  301-350= 4 units  351-400= 5 units  401+ = Call Prescriber      • lacosamide (VIMPAT) 10 MG/ML solution oral solution 200 mg by Per G Tube route Every 12 (Twelve) Hours.      • levETIRAcetam (KEPPRA) 100 MG/ML solution 1,000 mg by Per G Tube route 2 (Two) Times a Day.      • LORazepam (ATIVAN) 2 MG/ML injection Inject 2 mg into the appropriate muscle as directed by prescriber As Needed for Seizures.      • metoclopramide (REGLAN) 5 MG/5ML solution 5 mg by Per G Tube route 4 (Four) Times a Day.      • metoprolol tartrate (LOPRESSOR) 25 MG tablet 25 mg by Per G Tube route 2 (Two) Times a Day.      • midodrine (PROAMATINE) 5 MG tablet 5 mg by Per G Tube route 2 (two) times a day.      • nystatin-triamcinolone (MYCOLOG II) 485189-3.1 UNIT/GM-% cream Apply 1 application topically to the appropriate area as directed Daily. Apply to arms, back, and torso       • omeprazole (PriLOSEC) 5 mg/mL suspension 40 mg by Per G Tube route Every Morning.      • ondansetron (ZOFRAN) 4 MG/5ML solution 4 mg by Per G Tube route Every 8 (Eight) Hours As Needed for Nausea or Vomiting.      • piperacillin-tazobactam (ZOSYN) IVPB 3.375 g in 100 mL NS Infuse 3.375 g into a venous catheter 3 (Three) Times a Day.      • sennosides-docusate (senna-docusate sodium) 8.6-50 MG per tablet Take 1 tablet by mouth 2 (two) times a day.      • zinc oxide (DESITIN) 40 % paste paste Apply 1 application topically to the appropriate area as directed Daily. Apply to buttocks       • insulin lispro (humaLOG) 100 UNIT/ML injection Inject 7 Units under the skin into the appropriate area as directed Every 6 (Six) Hours.   Unknown at Unknown time       Allergies  Nuts    Scheduled Meds:    amiodarone 200 mg Per G Tube Daily   enoxaparin 40 mg Subcutaneous Daily   ferrous sulfate 300 mg Per G Tube Daily   insulin glargine 27 Units Subcutaneous Q12H   lacosamide (VIMPAT) IVPB  200 mg Intravenous Q12H   levETIRAcetam 1,000 mg Intravenous Q12H   metoprolol tartrate 25 mg Per G Tube Q12H   midodrine 5 mg Per G Tube BID AC   nystatin-triamcinolone 1 application Topical Daily   piperacillin-tazobactam (ZOSYN) IVPB 3.375 g in 100 mL NS 3.375 g Intravenous Q8H   sennosides-docusate 1 tablet Oral BID   sodium chloride 10 mL Intravenous Q12H   sodium chloride 10 mL Intravenous Q12H   zinc oxide  Topical Daily     Continuous Infusions:    dextrose 5 % and sodium chloride 0.45 % 100 mL/hr Last Rate: 100 mL/hr (08/19/20 0405)     PRN Meds:  •  acetaminophen  •  acetaminophen **OR** acetaminophen **OR** acetaminophen  •  aluminum-magnesium hydroxide-simethicone  •  bisacodyl  •  diphenhydrAMINE  •  LORazepam  •  magnesium hydroxide  •  magnesium sulfate **OR** magnesium sulfate in D5W 1g/100mL (PREMIX)  •  melatonin  •  nitroglycerin  •  ondansetron **OR** ondansetron  •  potassium chloride  •  sodium chloride  •  sodium chloride    Lab Results (last 24 hours)     Procedure Component Value Units Date/Time    POC Glucose Once [[id number]]  (Normal) Collected:  08/19/20 0702    Specimen:  Blood Updated:  08/19/20 0702     Glucose 105 mg/dL      Comment: Serial Number: [id number]:  [id number]       Basic Metabolic Panel [[id number]]  (Abnormal) Collected:  08/19/20 0445    Specimen:  Blood Updated:  08/19/20 0557     Glucose 153 mg/dL      BUN --     Comment: Testing performed by alternate method        Creatinine 0.93 mg/dL      Sodium 142 mmol/L      Potassium 4.1 mmol/L      Comment: Specimen hemolyzed.  Results may be affected.        Chloride 104 mmol/L      CO2 27.0 mmol/L      Calcium 9.0 mg/dL      eGFR Non African Amer 80 mL/min/1.73      BUN/Creatinine Ratio --     Comment: Testing not performed        Anion Gap 11.0 mmol/L     Narrative:       GFR Normal >60  Chronic Kidney Disease <60  Kidney Failure <15      Magnesium [[id number]]  (Normal) Collected:  08/19/20 0445    Specimen:  Blood  Updated:  08/19/20 0557     Magnesium 2.1 mg/dL     BUN [[id number]] Collected:  08/19/20 0445    Specimen:  Blood Updated:  08/19/20 0557    CBC Auto Differential [[id number]]  (Abnormal) Collected:  08/19/20 0445    Specimen:  Blood Updated:  08/19/20 0525     WBC 7.50 10*3/mm3      RBC 4.12 10*6/mm3      Hemoglobin 11.1 g/dL      Hematocrit 34.2 %      MCV 83.1 fL      MCH 26.9 pg      MCHC 32.4 g/dL      RDW 15.9 %      RDW-SD 46.8 fl      MPV 9.8 fL      Platelets 236 10*3/mm3      Neutrophil % 64.6 %      Lymphocyte % 22.3 %      Monocyte % 6.0 %      Eosinophil % 6.0 %      Basophil % 1.1 %      Neutrophils, Absolute 4.90 10*3/mm3      Lymphocytes, Absolute 1.70 10*3/mm3      Monocytes, Absolute 0.50 10*3/mm3      Eosinophils, Absolute 0.50 10*3/mm3      Basophils, Absolute 0.10 10*3/mm3      nRBC 0.1 /100 WBC     BUN [[id number]]  (Normal) Collected:  08/18/20 2018    Specimen:  Blood Updated:  08/18/20 2154     BUN 23 mg/dL     Comprehensive Metabolic Panel [[id number]]  (Abnormal) Collected:  08/18/20 2018    Specimen:  Blood Updated:  08/18/20 2148     Glucose 143 mg/dL      BUN --     Comment: Testing performed by alternate method        Creatinine 0.98 mg/dL      Sodium 143 mmol/L      Potassium 4.2 mmol/L      Chloride 104 mmol/L      CO2 29.0 mmol/L      Calcium 9.6 mg/dL      Total Protein 7.6 g/dL      Albumin 3.30 g/dL      ALT (SGPT) 21 U/L      AST (SGOT) 25 U/L      Alkaline Phosphatase 69 U/L      Total Bilirubin 0.2 mg/dL      eGFR Non African Amer 75 mL/min/1.73      Globulin 4.3 gm/dL      A/G Ratio 0.8 g/dL      BUN/Creatinine Ratio --     Comment: Testing not performed        Anion Gap 10.0 mmol/L     Narrative:       GFR Normal >60  Chronic Kidney Disease <60  Kidney Failure <15      POC Glucose Once [[id number]]  (Abnormal) Collected:  08/18/20 2119    Specimen:  Blood Updated:  08/18/20 2120     Glucose 111 mg/dL      Comment: Serial Number: [id number]:  [id number]       CBC &  Differential [[id number]] Collected:  08/18/20 2018    Specimen:  Blood Updated:  08/18/20 2103    Narrative:       The following orders were created for panel order CBC & Differential.  Procedure                               Abnormality         Status                     ---------                               -----------         ------                     CBC Auto Differential[[id number]]        Abnormal            Final result                 Please view results for these tests on the individual orders.    CBC Auto Differential [[id number]]  (Abnormal) Collected:  08/18/20 2018    Specimen:  Blood Updated:  08/18/20 2103     WBC 8.20 10*3/mm3      RBC 4.45 10*6/mm3      Hemoglobin 11.7 g/dL      Hematocrit 37.8 %      MCV 84.9 fL      MCH 26.2 pg      MCHC 30.9 g/dL      RDW 16.0 %      RDW-SD 48.6 fl      MPV 9.3 fL      Platelets 266 10*3/mm3      Neutrophil % 70.6 %      Lymphocyte % 18.0 %      Monocyte % 4.6 %      Eosinophil % 5.9 %      Basophil % 0.9 %      Neutrophils, Absolute 5.80 10*3/mm3      Lymphocytes, Absolute 1.50 10*3/mm3      Monocytes, Absolute 0.40 10*3/mm3      Eosinophils, Absolute 0.50 10*3/mm3      Basophils, Absolute 0.10 10*3/mm3      nRBC 0.1 /100 WBC               Palliative Assessment     Vital Signs (last 24 hours)       08/18 0700  -  08/19 0659 08/19 0700  -  08/19 0828   Most Recent    Temp (°F) 97.5 -  98.8       97.5 (36.4)    Heart Rate 59 -  72       60    Resp 19 -  20       19    /48 -  144/79       144/79    SpO2 (%) 91 -  99       99        Physical Exam   Constitutional:   Minimally responsive to stimulation. Patient being moved onto specialty bed and opens eyes    HENT:   Head: Normocephalic.   Eyes: Pupils are equal, round, and reactive to light.   Cardiovascular:   Irregular rate and rhythm / chronic atrial fib   Pulmonary/Chest:   Bilateral breath sounds course rhonchi   Abdominal:   Partially broken G tube under abd binder with patient attempting to pull at  tube and left arm in restraints   Neurological:   Not verbally responsive   Skin:   Multiple areas of skin breakdown on chest / left arm ? Rash vs bites   Vitals reviewed.        Decisional Capacity: no  Patient's understanding of illness: no  Patient goals of care:  Discussed with spouse per         Sidra Tobias, APRN

## 2020-08-19 NOTE — CONSULTS
Continued Stay Note  [facility]     Patient Name: Juan Ann  MRN: [id number]  Today's Date: 8/19/2020    Admit Date: 8/18/2020    Discharge Plan     Row Name 08/19/20 1623       Plan    Plan  Return to [facility] at d/c. No PASRR or pre-cert needed.     Plan Comments  Okay to return to [facility] at d/c. CM confirmed with pt's family. SW confirmed w/ facility liaison.      Melany Limon, MAREKW, LSW  PRN   Phone: [phone]

## 2020-08-19 NOTE — SIGNIFICANT NOTE
"Full Code / Aggressive Care    Palliative care  met with pt, pt unable to respond meaningfully to questions due to dementia.  Pt's ECF was called to obtain next-of-kin contact information due to there not being any phone numbers on the chart.  Pt was living at [facility] prior to admission.  [facility]  reports that the pt's wife, Abbie, was the pt's guardian and that they had just recently discussed the pt's goals and reviewed his code status using a POST form.   reports that the pt has consistently insisted that the pt remain a full code and that all aggressive measures be provided for his care and treatment.  SW was hopeful that we may be able to change her mind with the circumstances of this admission.    Pt's wife, Abbie ([phone]), was called.  She was very enthusiastic about discussing the pt's care and immediately noted that she was told the pt would be having the PEG tube replaced today and hopefully be sent back to [facility] soon.  When goals of care were discussed wife reported, \"He's been dealing with this for 7 years and he's pulled out other tubes and lines and they just put them back in.  He was just in the hospital two days ago, but the only reason why he keeps on going back is because of all this COVID stuff.\"  I discussed goals of care in light of recurring hospitalizations and his overall quality of life.  Wife reports, \"He's been like this for a while and he doesn't have a good quality of life, but I ain't giving up on him.  I promised my dad before he  that I would never give up on him because I had an uncle that the doctors said would never walk and now he can walk fine so them doctors don't know anything.  Miracles happen, the brain is a powerful thing, he could still get better.\"  Wife also discussed her frustration and disregard for COVID-19 precautions, and that this affected her overall trust of medical providers when it comes to the care of the " "patient.  Wife notes that the pt had been a DNR at one point, but described that she changed it to a full code when she noticed his care had been lacking.  \"They weren't doing anything for him and one of the aides told me a while back that they don't do anything for the DNR patients, and I want him to get the absolute best care so he will always be a full code no matter what,\" reports pt's wife.        Pt's guardian is wife, and documentation is on chart.  Pt's ECF  reports that pt does have a POST form instructing all aggressive measures be taken.  Emotional support provided.  Will continue to follow.  Thank you for the consult.         08/19/20 0900   PC Encounter Information   Palliative Care Patient? yes   Referral Date 08/18/20   Referral Time 2320   Date of Initial Encounter with a Palliative Care Provider 08/19/20   Time of initial encounter with a Palliative Care Provider 0930   Time Required for Initial Referral 30 mins   Additional Visit/Time Required to Achieve Results 15 mins   Patient Unit at Time of Referral 3C   Patient Unit Specialty at Time of Referral medical surgical   Primary Diagnosis Leading to PC Consult dementia   Code Status at Time of Consult full   Palliative Care Team Members Involved in Consultation nurse practitioner;   Symptom Distress Data   Non-Pain Symptoms yes/pos   Non-Pain Symptoms Intervened yes   Pain Assessment Data   Pain Screening yes/pos   Pain Intervened yes   Screening Status/Interventions Data   Psychosocial Needs pos   Psychosocial Needs Intervened yes   Spiritual Needs unable   Goals of Care/ACP neg   Health Care Directives/Treatment Preferences Data   Advance Directive Document on Chart at Time of Consult yes   Pre-existing AND/MOST/POLST Order Data Yes, notify physician for order   Advance Directive Status Patient has advance directive, copy requested   Goals of Care/Treatment Preferences (initial assessment and clinical changes) Completed "   Treatment Preferences full scope of treatment   Code Status Discussed yes   Outcomes   Code Status After Consult full

## 2020-08-20 ENCOUNTER — ON CAMPUS - OUTPATIENT (OUTPATIENT)
Dept: URBAN - METROPOLITAN AREA HOSPITAL 85 | Facility: HOSPITAL | Age: 75
End: 2020-08-20

## 2020-08-20 DIAGNOSIS — K94.23 GASTROSTOMY MALFUNCTION: ICD-10-CM

## 2020-08-20 LAB
ALBUMIN SERPL-MCNC: 3.3 G/DL (ref 3.5–5.2)
ALBUMIN/GLOB SERPL: 0.8 G/DL
ALP SERPL-CCNC: 64 U/L (ref 39–117)
ALT SERPL W P-5'-P-CCNC: 19 U/L (ref 1–41)
ANION GAP SERPL CALCULATED.3IONS-SCNC: 9 MMOL/L (ref 5–15)
AST SERPL-CCNC: 31 U/L (ref 1–40)
BASOPHILS # BLD AUTO: 0.1 10*3/MM3 (ref 0–0.2)
BASOPHILS NFR BLD AUTO: 1 % (ref 0–1.5)
BILIRUB SERPL-MCNC: 0.2 MG/DL (ref 0–1.2)
BUN SERPL-MCNC: 16 MG/DL (ref 8–23)
BUN SERPL-MCNC: ABNORMAL MG/DL
BUN/CREAT SERPL: ABNORMAL
CALCIUM SPEC-SCNC: 8.8 MG/DL (ref 8.6–10.5)
CHLORIDE SERPL-SCNC: 106 MMOL/L (ref 98–107)
CLUMPED PLATELETS: PRESENT
CO2 SERPL-SCNC: 28 MMOL/L (ref 22–29)
CREAT SERPL-MCNC: 1 MG/DL (ref 0.76–1.27)
DEPRECATED RDW RBC AUTO: 47.7 FL (ref 37–54)
EOSINOPHIL # BLD AUTO: 0.6 10*3/MM3 (ref 0–0.4)
EOSINOPHIL NFR BLD AUTO: 5.3 % (ref 0.3–6.2)
ERYTHROCYTE [DISTWIDTH] IN BLOOD BY AUTOMATED COUNT: 15.9 % (ref 12.3–15.4)
GFR SERPL CREATININE-BSD FRML MDRD: 73 ML/MIN/1.73
GLOBULIN UR ELPH-MCNC: 4 GM/DL
GLUCOSE SERPL-MCNC: 116 MG/DL (ref 65–99)
HCT VFR BLD AUTO: 33.8 % (ref 37.5–51)
HGB BLD-MCNC: 10.5 G/DL (ref 13–17.7)
LYMPHOCYTES # BLD AUTO: 2.1 10*3/MM3 (ref 0.7–3.1)
LYMPHOCYTES NFR BLD AUTO: 18.8 % (ref 19.6–45.3)
MAGNESIUM SERPL-MCNC: 2 MG/DL (ref 1.6–2.4)
MCH RBC QN AUTO: 26.2 PG (ref 26.6–33)
MCHC RBC AUTO-ENTMCNC: 31.1 G/DL (ref 31.5–35.7)
MCV RBC AUTO: 84.2 FL (ref 79–97)
MONOCYTES # BLD AUTO: 0.6 10*3/MM3 (ref 0.1–0.9)
MONOCYTES NFR BLD AUTO: 5.5 % (ref 5–12)
NEUTROPHILS NFR BLD AUTO: 69.4 % (ref 42.7–76)
NEUTROPHILS NFR BLD AUTO: 7.6 10*3/MM3 (ref 1.7–7)
NRBC BLD AUTO-RTO: 0.2 /100 WBC (ref 0–0.2)
PHOSPHATE SERPL-MCNC: 2.6 MG/DL (ref 2.5–4.5)
PLATELET # BLD AUTO: 225 10*3/MM3 (ref 140–450)
PMV BLD AUTO: 9.6 FL (ref 6–12)
POTASSIUM SERPL-SCNC: 4.1 MMOL/L (ref 3.5–5.2)
PROT SERPL-MCNC: 7.3 G/DL (ref 6–8.5)
RBC # BLD AUTO: 4.01 10*6/MM3 (ref 4.14–5.8)
RBC MORPH BLD: NORMAL
SODIUM SERPL-SCNC: 143 MMOL/L (ref 136–145)
WBC # BLD AUTO: 11 10*3/MM3 (ref 3.4–10.8)
WBC MORPH BLD: NORMAL

## 2020-08-20 PROCEDURE — 85007 BL SMEAR W/DIFF WBC COUNT: CPT | Performed by: NURSE PRACTITIONER

## 2020-08-20 PROCEDURE — 80053 COMPREHEN METABOLIC PANEL: CPT | Performed by: NURSE PRACTITIONER

## 2020-08-20 PROCEDURE — 85025 COMPLETE CBC W/AUTO DIFF WBC: CPT | Performed by: NURSE PRACTITIONER

## 2020-08-20 PROCEDURE — G0378 HOSPITAL OBSERVATION PER HR: HCPCS

## 2020-08-20 PROCEDURE — 94799 UNLISTED PULMONARY SVC/PX: CPT

## 2020-08-20 PROCEDURE — 25010000003 LEVETIRACETAM IN NACL 0.75% 1000 MG/100ML SOLUTION: Performed by: PHYSICIAN ASSISTANT

## 2020-08-20 PROCEDURE — 43762 RPLC GTUBE NO REVJ TRC: CPT | Performed by: INTERNAL MEDICINE

## 2020-08-20 PROCEDURE — 84100 ASSAY OF PHOSPHORUS: CPT

## 2020-08-20 PROCEDURE — 25010000002 ENOXAPARIN PER 10 MG: Performed by: PHYSICIAN ASSISTANT

## 2020-08-20 PROCEDURE — 99225 PR SBSQ OBSERVATION CARE/DAY 25 MINUTES: CPT | Performed by: HOSPITALIST

## 2020-08-20 PROCEDURE — 63710000001 INSULIN GLARGINE PER 5 UNITS: Performed by: PHYSICIAN ASSISTANT

## 2020-08-20 PROCEDURE — 96361 HYDRATE IV INFUSION ADD-ON: CPT

## 2020-08-20 PROCEDURE — 83735 ASSAY OF MAGNESIUM: CPT | Performed by: PHYSICIAN ASSISTANT

## 2020-08-20 RX ADMIN — Medication 10 ML: at 10:00

## 2020-08-20 RX ADMIN — ZINC OXIDE: 200 OINTMENT TOPICAL at 10:00

## 2020-08-20 RX ADMIN — DAKIN'S SOLUTION 0.125% (QUARTER STRENGTH): 0.12 SOLUTION at 10:00

## 2020-08-20 RX ADMIN — AMIODARONE HYDROCHLORIDE 200 MG: 200 TABLET ORAL at 09:59

## 2020-08-20 RX ADMIN — Medication 1 PACKET: at 21:38

## 2020-08-20 RX ADMIN — ENOXAPARIN SODIUM 40 MG: 40 INJECTION SUBCUTANEOUS at 15:03

## 2020-08-20 RX ADMIN — METOPROLOL TARTRATE 25 MG: 25 TABLET, FILM COATED ORAL at 21:37

## 2020-08-20 RX ADMIN — Medication 10 ML: at 21:37

## 2020-08-20 RX ADMIN — INSULIN GLARGINE 27 UNITS: 100 INJECTION, SOLUTION SUBCUTANEOUS at 21:37

## 2020-08-20 RX ADMIN — SENNOSIDES AND DOCUSATE SODIUM 1 TABLET: 8.6; 5 TABLET ORAL at 21:37

## 2020-08-20 RX ADMIN — Medication 10 ML: at 21:38

## 2020-08-20 RX ADMIN — SODIUM CHLORIDE 200 MG: 900 INJECTION, SOLUTION INTRAVENOUS at 02:15

## 2020-08-20 RX ADMIN — SENNOSIDES AND DOCUSATE SODIUM 1 TABLET: 8.6; 5 TABLET ORAL at 09:59

## 2020-08-20 RX ADMIN — Medication 1 PACKET: at 10:00

## 2020-08-20 RX ADMIN — LEVETIRACETAM 1000 MG: 10 INJECTION INTRAVENOUS at 21:37

## 2020-08-20 RX ADMIN — MINERAL SUPPLEMENT IRON 300 MG / 5 ML STRENGTH LIQUID 100 PER BOX UNFLAVORED 300 MG: at 09:59

## 2020-08-20 RX ADMIN — METOPROLOL TARTRATE 25 MG: 25 TABLET, FILM COATED ORAL at 09:59

## 2020-08-20 RX ADMIN — LEVETIRACETAM 1000 MG: 10 INJECTION INTRAVENOUS at 10:00

## 2020-08-20 RX ADMIN — SODIUM CHLORIDE 200 MG: 900 INJECTION, SOLUTION INTRAVENOUS at 13:21

## 2020-08-20 RX ADMIN — DEXTROSE AND SODIUM CHLORIDE 100 ML/HR: 5; 450 INJECTION, SOLUTION INTRAVENOUS at 13:25

## 2020-08-20 RX ADMIN — NYSTATIN, TRIAMCINOLONE ACETONIDE 1 APPLICATION: 100000; 1 CREAM TOPICAL at 10:00

## 2020-08-20 RX ADMIN — Medication 10 ML: at 09:59

## 2020-08-20 NOTE — PLAN OF CARE
Problem: Patient Care Overview  Goal: Plan of Care Review  Outcome: Ongoing (interventions implemented as appropriate)  Flowsheets  Taken 8/20/2020 0530 by Estefani Liz, RN  Progress: no change  Taken 8/20/2020 0750 by Bernarda Hernandez, RN  Plan of Care Reviewed With: patient  Note:   G-tube replaced at bedside this morning by GI and tube feeds started, pt tolerating well. On 3L O2 and staying above 95%. Restraints removed and tolerating well. Will continue to monitor

## 2020-08-20 NOTE — PROGRESS NOTES
Continued Stay Note   [facility]     Patient Name: Juan Ann  MRN: [id number]  Today's Date: 8/20/2020    Admit Date: 8/18/2020    Discharge Plan     Row Name 08/20/20 1614       Plan    Plan  DC Plan: Return to [facility] pending clinical course. Must be sitter/restraint free 24 hrs. No PASRR or precert required.         Discharge Codes    No documentation.         JESSICA Deshpande    Phone # [phone]  Cell #[phone]  Fax#[phone]  [email or web address]      JESSICA Deshpande

## 2020-08-20 NOTE — POST-PROCEDURE NOTE
Procedure Report    Patient Name:  Juan Ann  YOB: 1945    Date of Surgery:       Pre-Op Diagnosis:  Broken PEG tube       Post-Op Diagnosis Codes:  Successful bedside PEG exchange      Procedure/CPT® Codes:          Staff:  * Surgery not found *      Anesthesia: None    Description of Procedure:  The previously placed PEG tube was gently pulled from the stomach.  A new 20 [place] replacement PEG was placed into the tract and 10 cc of sterile water was used to fill the balloon.  Gastric juices were aspirated to ensure correct placement.  The bumper was cinched down to the 4 cm abilio.  Patient tolerated the procedure well.    Specimen:        See Below    Estimated blood loss: none    Complications:  None    Impression:  Successful replacement of 20 [place] PEG tube    Recommendations:  Okay to use tube immediately      Raymundo Bacon MD     Date: 8/20/2020    Time: 14:45

## 2020-08-20 NOTE — PROGRESS NOTES
"      [facility] Medicine Services Daily Progress Note      Hospitalist Team  LOS 0 days      Patient Care Team:  Angeles Figueroa MD as PCP - General    Patient Location: 364/1      Subjective   Subjective     Chief Complaint / Subjective  Chief Complaint   Patient presents with   • medical device problem         Brief Synopsis of Hospital Course/HPI    The patient is a 74 y.o. 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  The patient was sent from [facility] to the ED on 8/18/2020 after he pulled his G-tube and midline on 8/18/2020 .    The patient is unable to provide history.    Date::      8/19/20: afebrile.  Nonverbal.  GI consulted.  8/20/20: s/p bedside PEG tube exchange this a.m. TF. Restarted.  We will keep overnight.    Review of Systems   All other systems reviewed and are negative.        Objective   Objective      Vital Signs  Temp:  [96.3 °F (35.7 °C)-98.9 °F (37.2 °C)] 96.3 °F (35.7 °C)  Heart Rate:  [60] 60  Resp:  [16-18] 16  BP: (131-156)/(64-82) 131/64  Oxygen Therapy  SpO2: 99 %  Pulse Oximetry Type: Continuous  Device (Oxygen Therapy): nasal cannula  Device (Oxygen Therapy): tracheostomy collar  Flow (L/min): 3  Oxygen Concentration (%): 35  Flowsheet Rows      First Filed Value   Admission Height  172.7 cm (68\") Documented at 08/18/2020 1659   Admission Weight  68 kg (150 lb) Documented at 08/18/2020 1659        Intake & Output (last 3 days)       08/17 0701 - 08/18 0700 08/18 0701 - 08/19 0700 08/19 0701 - 08/20 0700 08/20 0701 - 08/21 0700    Other    20    Total Intake(mL/kg)    20 (0.3)    Urine (mL/kg/hr)  850 350 (0.2)     Stool  0      Total Output  850 350     Net  -850 -350 +20            Stool Unmeasured Occurrence  2 x          Lines, Drains & Airways    Active LDAs     Name:   Placement date:   Placement time:   " Site:   Days:    Midline Catheter - Single Lumen 08/18/20 Left Basilic   08/18/20    1745     less than 1    Gastrostomy/Enterostomy LUQ   08/18/20 unknown    2000    LUQ   less than 1    Urethral Catheter   08/18/20 2000     less than 1    Surgical Airway   08/18/20 unknown    2000    --   less than 1                  Physical Exam:    Physical Exam   Constitutional:   Nonverbal   HENT:   Head: Normocephalic.   Eyes: No scleral icterus.   Neck: No thyroid mass present.       Cardiovascular: Normal rate and regular rhythm.   Pulmonary/Chest: Effort normal. He has decreased breath sounds in the right lower field and the left lower field.   Abdominal: Bowel sounds are normal. There is no tenderness.       Musculoskeletal:   Flexed hip and not following commands   Lymphadenopathy:     He has no cervical adenopathy.   Neurological:   Not following commands.  Opens his eyes.  Nonverbal   Skin: Skin is warm and dry.        Psychiatric:   Not following commands.  Opens his eyes.  Nonverbal            Wounds (last 24 hours)      LDA Wound     Row Name 08/20/20 0750 08/19/20 1901          Wound 08/18/20 2020 Right gluteal Pressure Injury    Wound - Properties Group Date first assessed: 08/18/20  -RR Time first assessed: 2020  -RR Present on Hospital Admission: Y  -RR Side: Right  -RR Location: gluteal  -RR Primary Wound Type: Pressure inj  -RR Stage, Pressure Injury: Stage 3;Stage 4  -RR    Dressing Appearance  dry;intact;no drainage  -BB  dry;intact  -SD     Closure  Adhesive bandage  -BB  Adhesive bandage  -SD     Base  yellow;white;moist  -BB  white;yellow;moist  -SD     Periwound  intact;dry  -BB  intact;dry;redness  -SD     Periwound Temperature  warm  -BB  warm  -SD     Dressing Care, Wound  dressing changed  -BB  --        Wound 08/18/20 2022 Right heel Pressure Injury    Wound - Properties Group Date first assessed: 08/18/20  -RR Time first assessed: 2022  -RR Present on Hospital Admission: Y  -RR Side: Right  -RR  Location: heel  -RR Primary Wound Type: Pressure inj  -RR Stage, Pressure Injury: Stage 1  -RR    Dressing Appearance  open to air  -BB  open to air  -SD     Closure  Open to air  -BB  Open to air  -SD     Base  pink;non-blanchable  -BB  pink;non-blanchable  -SD     Periwound  intact  -BB  intact;redness  -SD     Periwound Temperature  warm  -BB  warm  -SD     Drainage Amount  none  -BB  none  -SD        Wound 08/18/20 2026 coccyx Pressure Injury    Wound - Properties Group Date first assessed: 08/18/20  -RR Time first assessed: 2026  -RR Present on Hospital Admission: Y  -RR Location: coccyx  -RR Primary Wound Type: Pressure inj  -RR Stage, Pressure Injury: Stage 1;Stage 2  -RR    Dressing Appearance  open to air  -BB  open to air  -SD     Closure  Open to air  -BB  Open to air  -SD     Periwound  redness  -BB  redness  -SD     Periwound Temperature  --  warm  -SD     Periwound Care, Wound  barrier ointment applied  -BB  barrier ointment applied  -SD        Wound 08/18/20 2028 Left anterior foot Pressure Injury    Wound - Properties Group Date first assessed: 08/18/20  -RR Time first assessed: 2028  -RR Present on Hospital Admission: Y  -RR Side: Left  -RR Orientation: anterior  -RR Location: foot  -RR Primary Wound Type: Pressure inj  -RR Stage, Pressure Injury: deep tissue injury  -RR    Dressing Appearance  open to air  -BB  open to air  -SD     Closure  Open to air  -BB  Open to air  -SD     Drainage Amount  none  -BB  none  -SD     Dressing Care, Wound  dressing changed  -BB  --       User Key  (r) = Recorded By, (t) = Taken By, (c) = Cosigned By    Initials Name Provider Type    RR Kin Wadsworth, RN Registered Nurse    BB Bernarda Hernandez, RN Registered Nurse    Estefani Louis RN Registered Nurse          Procedures:              Results Review:     I reviewed the patient's new clinical results.      Lab Results (last 24 hours)     Procedure Component Value Units Date/Time    BUN [[id number]]   (Normal) Collected:  08/20/20 0403    Specimen:  Blood Updated:  08/20/20 0805     BUN 16 mg/dL     CBC & Differential [[id number]] Collected:  08/20/20 0523    Specimen:  Blood Updated:  08/20/20 0800    Narrative:       The following orders were created for panel order CBC & Differential.  Procedure                               Abnormality         Status                     ---------                               -----------         ------                     CBC Auto Differential[[id number]]        Abnormal            Final result                 Please view results for these tests on the individual orders.    CBC Auto Differential [[id number]]  (Abnormal) Collected:  08/20/20 0523    Specimen:  Blood Updated:  08/20/20 0800     WBC 11.00 10*3/mm3      RBC 4.01 10*6/mm3      Hemoglobin 10.5 g/dL      Hematocrit 33.8 %      MCV 84.2 fL      MCH 26.2 pg      MCHC 31.1 g/dL      RDW 15.9 %      RDW-SD 47.7 fl      MPV 9.6 fL      Platelets 225 10*3/mm3      Comment: Platelet estimate performed due to platelet clumping.         Neutrophil % 69.4 %      Lymphocyte % 18.8 %      Monocyte % 5.5 %      Eosinophil % 5.3 %      Basophil % 1.0 %      Neutrophils, Absolute 7.60 10*3/mm3      Lymphocytes, Absolute 2.10 10*3/mm3      Monocytes, Absolute 0.60 10*3/mm3      Eosinophils, Absolute 0.60 10*3/mm3      Basophils, Absolute 0.10 10*3/mm3      nRBC 0.2 /100 WBC     Scan Slide [[id number]] Collected:  08/20/20 0523    Specimen:  Blood Updated:  08/20/20 0800     RBC Morphology Normal     WBC Morphology Normal     Clumped Platelets Present    Narrative:       Platelet estimate performed due to platelet clumping.      Comprehensive Metabolic Panel [[id number]]  (Abnormal) Collected:  08/20/20 0403    Specimen:  Blood Updated:  08/20/20 0538     Glucose 116 mg/dL      BUN --     Comment: Testing performed by alternate method        Creatinine 1.00 mg/dL      Sodium 143 mmol/L      Potassium 4.1 mmol/L      Comment: Specimen  hemolyzed.  Results may be affected.        Chloride 106 mmol/L      CO2 28.0 mmol/L      Calcium 8.8 mg/dL      Total Protein 7.3 g/dL      Albumin 3.30 g/dL      ALT (SGPT) 19 U/L      AST (SGOT) 31 U/L      Comment: Specimen hemolyzed.  Results may be affected.        Alkaline Phosphatase 64 U/L      Total Bilirubin 0.2 mg/dL      eGFR Non African Amer 73 mL/min/1.73      Globulin 4.0 gm/dL      A/G Ratio 0.8 g/dL      BUN/Creatinine Ratio --     Comment: Testing not performed        Anion Gap 9.0 mmol/L     Narrative:       GFR Normal >60  Chronic Kidney Disease <60  Kidney Failure <15      Magnesium [[id number]]  (Normal) Collected:  08/20/20 0403    Specimen:  Blood Updated:  08/20/20 0525     Magnesium 2.0 mg/dL     Phosphorus [[id number]]  (Normal) Collected:  08/20/20 0403    Specimen:  Blood Updated:  08/20/20 0525     Phosphorus 2.6 mg/dL         No results found for: HGBA1C            No results found for: LIPASE  Lab Results   Component Value Date    CHLPL <50 05/18/2020    TRIG 58 05/18/2020    HDL 6 (L) 05/18/2020    LDL UNABLE TO CALCULATE 05/18/2020       No results found for: INTRAOP, PREDX, FINALDX, COMDX    Microbiology Results (last 10 days)     ** No results found for the last 240 hours. **          ECG/EMG Results (most recent)     None                    No radiology results for the last 7 days        Xrays, labs reviewed personally by physician.    Medication Review:   I have reviewed the patient's current medication list      Scheduled Meds    amiodarone 200 mg Per G Tube Daily   Beneprotein 1 packet Per G Tube BID   enoxaparin 40 mg Subcutaneous Daily   ferrous sulfate 300 mg Per G Tube Daily   insulin glargine 27 Units Subcutaneous Q12H   lacosamide (VIMPAT) IVPB 200 mg Intravenous Q12H   levETIRAcetam 1,000 mg Intravenous Q12H   metoprolol tartrate 25 mg Per G Tube Q12H   midodrine 5 mg Per G Tube BID AC   nystatin-triamcinolone 1 application Topical Daily   sennosides-docusate 1 tablet  Oral BID   sodium chloride 10 mL Intravenous Q12H   sodium chloride 10 mL Intravenous Q12H   sodium hypochlorite  Topical Daily   zinc oxide  Topical Daily       Meds Infusions    dextrose 5 % and sodium chloride 0.45 % 100 mL/hr Last Rate: 100 mL/hr (08/20/20 1325)       Meds PRN  •  acetaminophen  •  acetaminophen **OR** acetaminophen **OR** acetaminophen  •  aluminum-magnesium hydroxide-simethicone  •  bisacodyl  •  diphenhydrAMINE  •  LORazepam  •  magnesium hydroxide  •  magnesium sulfate **OR** magnesium sulfate in D5W 1g/100mL (PREMIX)  •  melatonin  •  nitroglycerin  •  ondansetron **OR** ondansetron  •  potassium chloride  •  sodium chloride  •  sodium chloride        Assessment/Plan   Assessment/Plan     Active Hospital Problems    Diagnosis  POA   • **Status post PICC central line placement [Z95.828]  Not Applicable   • Attention to G-tube (CMS/[facility]) [Z43.1]  Not Applicable   • Type 2 diabetes mellitus (CMS/[facility]) [E11.9]  Yes   • Atrial fibrillation (CMS/[facility]) [I48.91]  Yes   • Tracheostomy status (CMS/[facility]) [Z93.0]  Not Applicable   • CAD (coronary artery disease) [I25.10]  Yes   • History of coronary artery bypass graft [Z95.1]  Not Applicable   • Seizure disorder (CMS/[facility]) [G40.909]  Yes   • GERD (gastroesophageal reflux disease) [K21.9]  Yes   • Pacemaker [Z95.0]  Yes   • 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       MEDICAL DECISION MAKING COMPLEXITY BY PROBLEM:     S/P PICC line placement in ED       G-tube malfunction:  -GI consulted--> s/p bed side PEG tube exchange 8/20/2020  -Nutrition consulted     Rash of torso, arms and back  -Continue home mycolog     Right hip wound  - Patient is afebrile, pulse in the 60s, on 5 L nasal cannula 95% SPO2 and blood pressure normotensive.    -No medications given in ED.  -Wound care consult    Chronic hypoxemic respiratory  failure s/p tracheostomy   -Titrate pulse ox>92%    Intracranial hemorrhage residual hemiplegia with persistent vegetative state  -Seemingly at baseline, patient is nonverbal     Atrial fibrillation  -Hold home Eliquis, Lovenox until G-tube is replaced  -Continue home amiodarone, Lopressor when G-tube is replaced     Chronic hypotension  -Continue home midodrine when G-tube is replaced     Diabetes mellitus type 2  -Hold home Humulin,   -Continue home Levemir     Seizure disorder  -Continue Keppra IV until G-tube is replaced  -Continue home Vimpat IV until G-tube is replaced     GERD  -Continue home PPI when G-tube is replaced     CAD s/p CABG         VTE Prophylaxis -   Mechanical Order History:     None      Pharmalogical Order History:     Ordered     Dose Route Frequency Stop    08/18/20 2007  enoxaparin (LOVENOX) syringe 40 mg      40 mg SC Daily --            Code Status -   Code Status and Medical Interventions:   Ordered at: 08/18/20 2007     Code Status:    CPR     Medical Interventions (Level of Support Prior to Arrest):    Full       This patient has been examined wearing appropriate Personal Protective Equipment  08/20/20        Electronically signed by Vinh Queen DO, 08/20/20, 17:43.  [facility] Hospitalist Team

## 2020-08-20 NOTE — PLAN OF CARE
Problem: Patient Care Overview  Goal: Plan of Care Review  Outcome: Ongoing (interventions implemented as appropriate)  Flowsheets  Taken 8/20/2020 0530  Progress: no change  Taken 8/19/2020 1901  Plan of Care Reviewed With: patient  Note:   Patient rested comfortably in bed throughout the night without signs of distress.  Patient is still waiting on the g-tube placement.  No new concerns at this time, will continue to monitor.

## 2020-08-20 NOTE — PROGRESS NOTES
Nutrition Services    Patient Name:  Juan Ann  YOB: 1945  MRN: [id number]  Admit Date:  8/18/2020    Progress note:  PEG replaced at bedside this AM per discussion with pt's RN.  Plan to initiate tube feeds shortly.    Nutrition intervention: Novasource Renal @55mL/hour with 20mL/hour free water flush with Beneprotein 1 packet BID    Bowel: +BM 8/19    Labs reviewed:  Results from last 7 days   Lab Units 08/20/20  0403 08/19/20  0445 08/18/20 2018   SODIUM mmol/L 143 142 143   POTASSIUM mmol/L 4.1 4.1 4.2   CHLORIDE mmol/L 106 104 104   CO2 mmol/L 28.0 27.0 29.0   BUN  16 20 23   CREATININE mg/dL 1.00 0.93 0.98   CALCIUM mg/dL 8.8 9.0 9.6   BILIRUBIN mg/dL 0.2  --  0.2   ALK PHOS U/L 64  --  69   ALT (SGPT) U/L 19  --  21   AST (SGOT) U/L 31  --  25   GLUCOSE mg/dL 116* 153* 143*     Results from last 7 days   Lab Units 08/20/20  0523 08/20/20 0403 08/19/20 0445   MAGNESIUM mg/dL  --  2.0 2.1   PHOSPHORUS mg/dL  --  2.6  --    HEMOGLOBIN g/dL 10.5*  --  11.1*   HEMATOCRIT % 33.8*  --  34.2*           Electronically signed by:  Janiya Machuca RD  08/20/20 10:18

## 2020-08-21 ENCOUNTER — READMISSION MANAGEMENT (OUTPATIENT)
Dept: CALL CENTER | Facility: HOSPITAL | Age: 75
End: 2020-08-21

## 2020-08-21 VITALS
DIASTOLIC BLOOD PRESSURE: 70 MMHG | SYSTOLIC BLOOD PRESSURE: 138 MMHG | OXYGEN SATURATION: 100 % | RESPIRATION RATE: 21 BRPM | WEIGHT: 159.17 LBS | HEART RATE: 61 BPM | HEIGHT: 68 IN | TEMPERATURE: 98.5 F | BODY MASS INDEX: 24.12 KG/M2

## 2020-08-21 PROBLEM — Z95.828 STATUS POST PICC CENTRAL LINE PLACEMENT: Status: RESOLVED | Noted: 2020-08-18 | Resolved: 2020-08-21

## 2020-08-21 LAB
MAGNESIUM SERPL-MCNC: 1.9 MG/DL (ref 1.6–2.4)
NT-PROBNP SERPL-MCNC: 252.6 PG/ML (ref 0–900)
POTASSIUM SERPL-SCNC: 3.7 MMOL/L (ref 3.5–5.2)

## 2020-08-21 PROCEDURE — 63710000001 INSULIN GLARGINE PER 5 UNITS: Performed by: PHYSICIAN ASSISTANT

## 2020-08-21 PROCEDURE — G0378 HOSPITAL OBSERVATION PER HR: HCPCS

## 2020-08-21 PROCEDURE — 83735 ASSAY OF MAGNESIUM: CPT | Performed by: PHYSICIAN ASSISTANT

## 2020-08-21 PROCEDURE — 25010000003 LEVETIRACETAM IN NACL 0.75% 1000 MG/100ML SOLUTION: Performed by: PHYSICIAN ASSISTANT

## 2020-08-21 PROCEDURE — 99217 PR OBSERVATION CARE DISCHARGE MANAGEMENT: CPT | Performed by: HOSPITALIST

## 2020-08-21 PROCEDURE — 84132 ASSAY OF SERUM POTASSIUM: CPT | Performed by: PHYSICIAN ASSISTANT

## 2020-08-21 PROCEDURE — 83880 ASSAY OF NATRIURETIC PEPTIDE: CPT | Performed by: PHYSICIAN ASSISTANT

## 2020-08-21 RX ADMIN — Medication 10 ML: at 08:01

## 2020-08-21 RX ADMIN — LEVETIRACETAM 1000 MG: 10 INJECTION INTRAVENOUS at 08:01

## 2020-08-21 RX ADMIN — AMIODARONE HYDROCHLORIDE 200 MG: 200 TABLET ORAL at 08:00

## 2020-08-21 RX ADMIN — SODIUM CHLORIDE 200 MG: 900 INJECTION, SOLUTION INTRAVENOUS at 02:30

## 2020-08-21 RX ADMIN — SODIUM CHLORIDE 200 MG: 900 INJECTION, SOLUTION INTRAVENOUS at 14:41

## 2020-08-21 RX ADMIN — ZINC OXIDE: 200 OINTMENT TOPICAL at 08:00

## 2020-08-21 RX ADMIN — NYSTATIN, TRIAMCINOLONE ACETONIDE 1 APPLICATION: 100000; 1 CREAM TOPICAL at 08:00

## 2020-08-21 RX ADMIN — METOPROLOL TARTRATE 25 MG: 25 TABLET, FILM COATED ORAL at 08:00

## 2020-08-21 RX ADMIN — Medication 1 PACKET: at 07:57

## 2020-08-21 RX ADMIN — DAKIN'S SOLUTION 0.125% (QUARTER STRENGTH): 0.12 SOLUTION at 08:00

## 2020-08-21 RX ADMIN — SENNOSIDES AND DOCUSATE SODIUM 1 TABLET: 8.6; 5 TABLET ORAL at 08:00

## 2020-08-21 RX ADMIN — INSULIN GLARGINE 27 UNITS: 100 INJECTION, SOLUTION SUBCUTANEOUS at 07:58

## 2020-08-21 RX ADMIN — MINERAL SUPPLEMENT IRON 300 MG / 5 ML STRENGTH LIQUID 100 PER BOX UNFLAVORED 300 MG: at 08:00

## 2020-08-21 NOTE — PROGRESS NOTES
Continued Stay Note  [facility]     Patient Name: Juan Ann  MRN: [id number]  Today's Date: 8/21/2020    Admit Date: 8/18/2020    Discharge Plan     Row Name 08/21/20 1411       Plan    Plan  DC Plan: Pt is OK to return to [facility] on 8/21 after 15:00 as this will be 24 hr without sitter/restraing. No PASRR or precert required.         Discharge Codes    No documentation.       Expected Discharge Date and Time     Expected Discharge Date Expected Discharge Time    Aug 21, 2020           JESSICA Deshpande    Phone # [phone]  Cell #[phone]  Fax#[phone]  [email or web address]    JESSICA Deshpande

## 2020-08-21 NOTE — PROGRESS NOTES
Nutrition Services    Patient Name:  Juan Ann  YOB: 1945  MRN: [id number]  Admit Date:  8/18/2020    Progress note:  Noted patient to d/c back to LTC today.  Tube feeding infusing at goal rate, Novasource Renal @55mL/hour (NH regimen is consistent with this).  No tolerance issues are noted.    RD to follow up per protocol if patient does not discharge.    Electronically signed by:  Janiya Machuca RD  08/21/20 16:21

## 2020-08-21 NOTE — PLAN OF CARE
Problem: Patient Care Overview  Goal: Plan of Care Review  Outcome: Ongoing (interventions implemented as appropriate)  Flowsheets  Taken 8/21/2020 0573  Progress: improving  Taken 8/20/2020 1901  Plan of Care Reviewed With: patient  Note:   Patient rested comfortably in bed throughout the night.  No new concerns at this time, will continue to monitor.

## 2020-08-21 NOTE — DISCHARGE SUMMARY
[facility] Medicine Services  DISCHARGE SUMMARY        Prepared For PCP:  Angeles Figueroa MD    Patient Name: Juan Ann  : 1945  MRN: [id number]      Date of Admission:   2020    Date of Discharge:  2020    Length of stay:  LOS: 0 days     Hospital Course     Presenting Problem:   Attention to G-tube (CMS/HCC) [Z43.1]  Status post PICC central line placement [Z95.828]      Active Hospital Problems    Diagnosis  POA   • Attention to G-tube (CMS/HCC) [Z43.1]  Not Applicable     Priority: Medium   • Type 2 diabetes mellitus (CMS/[facility]) [E11.9]  Yes     Priority: Medium   • Atrial fibrillation (CMS/[facility]) [I48.91]  Yes     Priority: Medium   • Tracheostomy status (CMS/[facility]) [Z93.0]  Not Applicable     Priority: Medium   • CAD (coronary artery disease) [I25.10]  Yes     Priority: Medium   • History of coronary artery bypass graft [Z95.1]  Not Applicable     Priority: Medium   • Seizure disorder (CMS/[facility]) [G40.909]  Yes     Priority: Medium   • GERD (gastroesophageal reflux disease) [K21.9]  Yes     Priority: Medium   • Pacemaker [Z95.0]  Yes     Priority: Medium   • 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  Bedside PEG tube exchange was done by GI on 20.  The patient was able to tolerate tube feeds afterwards.  He was discharged back to the [facility] on 2020.          Recommendation for Outpatient Providers:             Reasons For Change In Medications and Indications for New Medications:        Day of Discharge     HPI:     The patient is a 74 y.o. male with history intercranial hemorrhage complicated with persistent vegetative state and  right hemiplegia, chronic hypoxemic respiratory failure s/p tracheostomy and G-tube .  The patient was sent from [facility] to the ED on 8/18/2020 after he pulled his G-tube and midline on 8/18/2020 .     The patient is unable to provide history.     Date::       8/19/20: afebrile.  Nonverbal.  GI consulted.  8/20/20: s/p bedside PEG tube exchange this a.m. TF. Restarted.  We will keep overnight.    Vital Signs:   Temp:  [97.6 °F (36.4 °C)-98.5 °F (36.9 °C)] 98.5 °F (36.9 °C)  Heart Rate:  [60-64] 64  Resp:  [20-25] 20  BP: (131-169)/(64-75) 132/66     Physical Exam:  Physical Exam   Constitutional: He appears well-developed.   HENT:   Head: Normocephalic and atraumatic.   Eyes: Pupils are equal, round, and reactive to light. EOM are normal.   Neck: Normal range of motion. Neck supple.   Cardiovascular: Normal rate and regular rhythm.   Pulmonary/Chest: Effort normal and breath sounds normal.   Abdominal: Soft. Bowel sounds are normal.       Pertinent  and/or Most Recent Results     Results from last 7 days   Lab Units 08/21/20  0343 08/20/20  0523 08/20/20  0403 08/19/20  0445 08/18/20 2018   WBC 10*3/mm3  --  11.00*  --  7.50 8.20   HEMOGLOBIN g/dL  --  10.5*  --  11.1* 11.7*   HEMATOCRIT %  --  33.8*  --  34.2* 37.8   PLATELETS 10*3/mm3  --  225  --  236 266   SODIUM mmol/L  --   --  143 142 143   POTASSIUM mmol/L 3.7  --  4.1 4.1 4.2   CHLORIDE mmol/L  --   --  106 104 104   CO2 mmol/L  --   --  28.0 27.0 29.0   BUN   --   --  16 20 23   CREATININE mg/dL  --   --  1.00 0.93 0.98   GLUCOSE mg/dL  --   --  116* 153* 143*   CALCIUM mg/dL  --   --  8.8 9.0 9.6     Results from last 7 days   Lab Units 08/20/20  0403 08/18/20 2018   BILIRUBIN mg/dL 0.2 0.2   ALK PHOS U/L 64 69   ALT (SGPT) U/L 19 21   AST (SGOT) U/L 31 25           Invalid input(s): TG, LDLCALC, LDLREALC  Results from last 7 days   Lab Units 08/21/20  0343   PROBNP pg/mL 252.6       Brief Urine Lab Results     None                 Microbiology Results Abnormal     None                                      Test Results Pending at Discharge        Procedures Performed:     Midline placement and  adjustment of the PEG tube           Consults:   Consults     Date and Time Order Name Status Description    8/18/2020 2007 Inpatient Gastroenterology Consult Completed             Discharge Details        Discharge Medications      Continue These Medications      Instructions Start Date   acetaminophen 160 MG/5ML solution  Commonly known as:  TYLENOL   160 mg, Per G Tube, Every 4 Hours PRN      amiodarone 200 MG tablet  Commonly known as:  PACERONE   200 mg, Per G Tube, Daily      apixaban 5 MG tablet tablet  Commonly known as:  ELIQUIS   5 mg, Per G Tube, 2 Times Daily      diphenhydrAMINE 12.5 MG/5ML elixir  Commonly known as:  BENADRYL   12.5 mg, Per G Tube, 4 Times Daily PRN      ferrous sulfate 300 (60 Fe) MG/5ML syrup   300 mg, Per G Tube, Every Morning      insulin detemir 100 UNIT/ML injection  Commonly known as:  LEVEMIR   27 Units, Subcutaneous, 2 Times Daily      insulin lispro 100 UNIT/ML injection  Commonly known as:  humaLOG   7 Units, Subcutaneous, Every 6 Hours      insulin regular 100 UNIT/ML injection  Commonly known as:  humuLIN R,novoLIN R   Subcutaneous, Every 6 Hours, Sliding Scale: 0-69= 0 units = 0 units 151-200= 1 unit 201-250= 2 units 251-300= 3 units 301-350= 4 units 351-400= 5 units 401+ = Call Prescriber       lacosamide 10 MG/ML solution oral solution  Commonly known as:  VIMPAT   200 mg, Per G Tube, Every 12 Hours Scheduled      levETIRAcetam 100 MG/ML solution  Commonly known as:  KEPPRA   1,000 mg, Per G Tube, 2 Times Daily      LORazepam 2 MG/ML injection  Commonly known as:  ATIVAN   2 mg, Intramuscular, As Needed      metoclopramide 5 MG/5ML solution  Commonly known as:  REGLAN   5 mg, Per G Tube, 4 Times Daily      metoprolol tartrate 25 MG tablet  Commonly known as:  LOPRESSOR   25 mg, Per G Tube, 2  Times Daily      midodrine 5 MG tablet  Commonly known as:  PROAMATINE   5 mg, Per G Tube, 2 times daily      nystatin-triamcinolone 575199-1.1 UNIT/GM-% cream  Commonly known as:  MYCOLOG II   1 application, Topical, Daily, Apply to arms, back, and torso      omeprazole 5 mg/mL suspension  Commonly known as:  PriLOSEC   40 mg, Per G Tube, Every Morning      ondansetron 4 MG/5ML solution  Commonly known as:  ZOFRAN   4 mg, Per G Tube, Every 8 Hours PRN      sennosides-docusate 8.6-50 MG per tablet  Commonly known as:  PERICOLACE   1 tablet, Oral, 2 times daily      zinc oxide 40 % paste paste  Commonly known as:  DESITIN   1 application, Topical, Daily, Apply to buttocks         Stop These Medications    piperacillin-tazobactam (ZOSYN) IVPB 3.375 g in 100 mL NS            Allergies   Allergen Reactions   • Nuts Unknown - High Severity         Discharge Disposition:  Home or Self Care    Diet:  Hospital:  Tube feeding via PEG tube      Discharge Activity:  As tolerated        CODE STATUS:    Code Status and Medical Interventions:   Ordered at: 08/18/20 2007     Code Status:    CPR     Medical Interventions (Level of Support Prior to Arrest):    Full         Follow-up Appointments  No future appointments.          Condition on Discharge:      Stable      This patient has been examined wearing appropriate Personal Protective Equipment 08/21/20      Electronically signed by Vinh Queen DO, 08/21/20, 11:16 AM.      Time: I spent  20  minutes on this discharge activity which included face-to-face encounter with the patient/reviewing the data in the system/coordination of the care with the nursing staff as well as consultants/documentation/entering orders.

## 2020-08-21 NOTE — OUTREACH NOTE
Prep Survey      Responses   [facility] patient discharged from?  [facility]   Is LACE score < 7 ?  No   Eligibility  Not Eligible   What are the reasons patient is not eligible?  [facility]   COVID-19 Test Status  Not tested   Does the patient have one of the following disease processes/diagnoses(primary or secondary)?  General Surgery   Prep survey completed?  Yes          Marva Wilkes RN

## 2020-08-21 NOTE — DISCHARGE INSTR - DIET
Tube Feeding Continuous  Novasource Renal @ 55ml/hr with 20 ml/hr free water flush, Beneprotein 1 packet BID    Diet NPO

## 2020-08-24 NOTE — PROGRESS NOTES
Case Management Discharge Note      Final Note: [facility]    Provided Post Acute Provider List?: N/A  N/A Provider List Comment: patient is a resident at [facility] for long term care    Destination - Selection Complete      Service Provider Request Status Selected Services Address Phone Number Fax Number    [facility] Selected [facility]mediate Care [address] [place] [id number] [phone]   Final Discharge Disposition Code: 04 - intermediate care facility